# Patient Record
Sex: FEMALE | Race: WHITE | Employment: FULL TIME | URBAN - NONMETROPOLITAN AREA
[De-identification: names, ages, dates, MRNs, and addresses within clinical notes are randomized per-mention and may not be internally consistent; named-entity substitution may affect disease eponyms.]

---

## 2022-06-14 ENCOUNTER — HOSPITAL ENCOUNTER (EMERGENCY)
Age: 62
Discharge: HOME | End: 2022-06-17
Attending: EMERGENCY MEDICINE
Payer: COMMERCIAL

## 2022-06-14 VITALS
TEMPERATURE: 97.5 F | DIASTOLIC BLOOD PRESSURE: 87 MMHG | RESPIRATION RATE: 20 BRPM | HEART RATE: 94 BPM | OXYGEN SATURATION: 92 % | SYSTOLIC BLOOD PRESSURE: 183 MMHG

## 2022-06-14 DIAGNOSIS — R73.9 HYPERGLYCEMIA: ICD-10-CM

## 2022-06-14 DIAGNOSIS — F17.200 TOBACCO USE DISORDER: ICD-10-CM

## 2022-06-14 DIAGNOSIS — R06.02 SHORTNESS OF BREATH: Primary | ICD-10-CM

## 2022-06-14 DIAGNOSIS — R03.0 ELEVATED BLOOD PRESSURE READING: ICD-10-CM

## 2022-06-14 PROCEDURE — 99205 OFFICE O/P NEW HI 60 MIN: CPT

## 2022-06-14 PROCEDURE — 99285 EMERGENCY DEPT VISIT HI MDM: CPT

## 2022-06-14 ASSESSMENT — ENCOUNTER SYMPTOMS
SHORTNESS OF BREATH: 1
DIARRHEA: 0
SORE THROAT: 0
CHOKING: 0
CONSTIPATION: 0
EYE PAIN: 0
FACIAL SWELLING: 0
ROS SKIN COMMENTS: NO RASH OR BRUISING
EYE DISCHARGE: 0
NAUSEA: 0
SINUS PRESSURE: 0
ABDOMINAL PAIN: 0
VOMITING: 0
TROUBLE SWALLOWING: 0
STRIDOR: 0
EYE REDNESS: 0
COUGH: 1
BACK PAIN: 0
WHEEZING: 0
VOICE CHANGE: 0
BLOOD IN STOOL: 0

## 2022-06-14 NOTE — ED PROVIDER NOTES
[address] [facility] [address] [address] EMERGENCY DEPT  [facility]       Chief Complaint   Patient presents with    Hyperglycemia    Shortness of Breath     has COPD and  its hard to breath in this heat and humidity. needs work slip. Nurses Notes reviewed and I agree except as noted in the HPI. HISTORY OF PRESENT ILLNESS   Eric Saini is a 64 y.o. female who presents with 24-hour history of shortness of breath, fatigue, with blood sugar elevated at 373 at home today. No chest pain or fever. Patient has poorly controlled diabetes, history of COPD and hypertension. Denies history of heart failure or CAD. Smoke cigarettes  REVIEW OF SYSTEMS     Review of Systems   Constitutional: Positive for appetite change and fatigue. Negative for chills, fever and unexpected weight change. Fatigue, decreased appetite no fever   HENT: Negative for congestion, ear discharge, ear pain, facial swelling, hearing loss, nosebleeds, postnasal drip, sinus pressure, sore throat, trouble swallowing and voice change. No upper respiratory symptoms   Eyes: Negative for pain, discharge, redness and visual disturbance. No redness or drainage   Respiratory: Positive for cough and shortness of breath. Negative for choking, wheezing and stridor. Shortness of breath cough   Cardiovascular: Negative for chest pain and leg swelling. No chest pain or syncope   Gastrointestinal: Negative for abdominal pain, blood in stool, constipation, diarrhea, nausea and vomiting. No abdominal pain or vomiting   Genitourinary: Negative for dysuria, flank pain, frequency, hematuria, urgency, vaginal bleeding and vaginal discharge. Musculoskeletal: Negative for arthralgias, back pain, neck pain and neck stiffness. Skin: Negative for rash. No rash or bruising   Neurological: Negative for dizziness, seizures, syncope, weakness, light-headedness and headaches.         No headache or lethargy Hematological: Negative for adenopathy. Does not bruise/bleed easily. Psychiatric/Behavioral: Negative for confusion, sleep disturbance and suicidal ideas. The patient is not nervous/anxious. red and bold elements reviewed    PAST MEDICAL HISTORY   History reviewed. No pertinent past medical history. SURGICAL HISTORY     Patient  has no past surgical history on file. CURRENT MEDICATIONS       Previous Medications    No medications on file       ALLERGIES     Patient is has No Known Allergies. FAMILY HISTORY     Patient'sfamily history is not on file. SOCIAL HISTORY     Patient  reports that she has been smoking cigarettes. She has been smoking about 0.50 packs per day. She has never used smokeless tobacco. She reports previous alcohol use. She reports that she does not use drugs. PHYSICAL EXAM     ED TRIAGE VITALS  BP: (!) 183/87, Temp: 97.5 °F (36.4 °C), Heart Rate: 94, Resp: 20, SpO2: 92 % (uses 1 ltr )2 at home)  Physical Exam  Vitals and nursing note reviewed. Constitutional:       General: She is not in acute distress. Appearance: She is well-developed. She is not ill-appearing. Comments: Moist membranes dry cough   HENT:      Head: Normocephalic and atraumatic. Right Ear: External ear normal.      Left Ear: External ear normal.      Nose: Nose normal.      Mouth/Throat:      Pharynx: No oropharyngeal exudate. Comments: Oropharynx normal  Eyes:      General: No scleral icterus. Right eye: No discharge. Left eye: No discharge. Extraocular Movements:      Right eye: Normal extraocular motion. Left eye: Normal extraocular motion. Conjunctiva/sclera: Conjunctivae normal.      Pupils: Pupils are equal, round, and reactive to light. Comments: Clear conjunctiva   Neck:      Thyroid: No thyromegaly. Vascular: No JVD. Comments: No meningismus  Cardiovascular:      Rate and Rhythm: Normal rate and regular rhythm.       Pulses: Normal pulses. Heart sounds: Normal heart sounds, S1 normal and S2 normal. No murmur heard. No friction rub. No gallop. Comments: No murmur  Pulmonary:      Effort: Pulmonary effort is normal. No tachypnea or respiratory distress. Breath sounds: No stridor. Decreased breath sounds present. No wheezing, rhonchi or rales. Comments: Decreased breath sounds  Chest:      Chest wall: No tenderness. Abdominal:      General: Bowel sounds are normal. There is no distension. Palpations: Abdomen is soft. There is no mass. Tenderness: There is no abdominal tenderness. There is no right CVA tenderness, left CVA tenderness, guarding or rebound. Musculoskeletal:         General: No tenderness. Normal range of motion. Cervical back: Normal range of motion. Comments: No DVT   Lymphadenopathy:      Cervical: No cervical adenopathy. Right cervical: No superficial cervical adenopathy. Left cervical: No superficial cervical adenopathy. Skin:     General: Skin is warm and dry. Findings: No erythema or rash. Comments: No rash or bruising   Neurological:      Mental Status: She is alert and oriented to person, place, and time. Cranial Nerves: No cranial nerve deficit. Motor: No abnormal muscle tone. Coordination: Coordination normal.      Deep Tendon Reflexes: Reflexes are normal and symmetric. Reflexes normal.      Comments: Appropriate no focal finding   Psychiatric:         Behavior: Behavior normal.         Thought Content: Thought content normal.         Judgment: Judgment normal.         DIAGNOSTIC RESULTS   Labs: No results found for this visit on 06/14/22. IMAGING:  No orders to display     URGENT CARE COURSE:     Vitals:    06/14/22 1822   BP: (!) 183/87   Pulse: 94   Resp: 20   Temp: 97.5 °F (36.4 °C)   TempSrc: Temporal   SpO2: 92%       Medications - No data to display  PROCEDURES:  None  FINALIMPRESSION      1. Shortness of breath    2.  Hyperglycemia 3. Elevated blood pressure reading    4. Tobacco use disorder        DISPOSITION/PLAN   DISPOSITION    Pt transferred to [facility] ED per her request.  She is stable for private vehicle transfer with friend to drive. Patient accepted in transfer by Maria Guadalupe Davidson [facility] ED charge nurse at 2037. PATIENT REFERRED TO:  to [facility] ED      to [facility] ED    DISCHARGE MEDICATIONS:  New Prescriptions    No medications on file     There are no discharge medications for this patient.       MD Joey West MD  06/14/22 [address], [place]  06/15/22 1007

## 2022-06-15 ASSESSMENT — ENCOUNTER SYMPTOMS
WHEEZING: 0
EYE DISCHARGE: 0
SINUS PRESSURE: 0
CHOKING: 0
CONSTIPATION: 0
STRIDOR: 0
SHORTNESS OF BREATH: 1
BACK PAIN: 0
SORE THROAT: 0
EYE REDNESS: 0
FACIAL SWELLING: 0
EYE PAIN: 0
VOICE CHANGE: 0
VOMITING: 0
ROS SKIN COMMENTS: NO RASH OR BRUISING
BLOOD IN STOOL: 0
TROUBLE SWALLOWING: 0
COUGH: 1
DIARRHEA: 0
ABDOMINAL PAIN: 0
NAUSEA: 0

## 2022-09-21 ENCOUNTER — APPOINTMENT (OUTPATIENT)
Dept: GENERAL RADIOLOGY | Age: 62
DRG: 140 | End: 2022-09-21
Payer: COMMERCIAL

## 2022-09-21 ENCOUNTER — HOSPITAL ENCOUNTER (INPATIENT)
Age: 62
LOS: 1 days | Discharge: HOME | DRG: 140 | End: 2022-09-23
Attending: EMERGENCY MEDICINE | Admitting: STUDENT IN AN ORGANIZED HEALTH CARE EDUCATION/TRAINING PROGRAM
Payer: COMMERCIAL

## 2022-09-21 DIAGNOSIS — J44.1 COPD EXACERBATION (HCC): Primary | ICD-10-CM

## 2022-09-21 DIAGNOSIS — J96.21 ACUTE ON CHRONIC RESPIRATORY FAILURE WITH HYPOXIA AND HYPERCAPNIA (HCC): ICD-10-CM

## 2022-09-21 DIAGNOSIS — J96.22 ACUTE ON CHRONIC RESPIRATORY FAILURE WITH HYPOXIA AND HYPERCAPNIA (HCC): ICD-10-CM

## 2022-09-21 DIAGNOSIS — E11.9 TYPE 2 DIABETES MELLITUS WITHOUT COMPLICATION, WITHOUT LONG-TERM CURRENT USE OF INSULIN (HCC): ICD-10-CM

## 2022-09-21 LAB
ALBUMIN SERPL-MCNC: 4 G/DL (ref 3.5–5.1)
ALLEN TEST: POSITIVE
ALP BLD-CCNC: 227 U/L (ref 38–126)
ALT SERPL-CCNC: 49 U/L (ref 11–66)
ANION GAP SERPL CALCULATED.3IONS-SCNC: 13 MEQ/L (ref 8–16)
AST SERPL-CCNC: 55 U/L (ref 5–40)
BASE EXCESS (CALCULATED): 2.7 MMOL/L (ref -2.5–2.5)
BASOPHILS # BLD: 0.5 %
BASOPHILS ABSOLUTE: 0.1 THOU/MM3 (ref 0–0.1)
BILIRUB SERPL-MCNC: 0.3 MG/DL (ref 0.3–1.2)
BUN BLDV-MCNC: 10 MG/DL (ref 7–22)
CALCIUM SERPL-MCNC: 9.4 MG/DL (ref 8.5–10.5)
CHLORIDE BLD-SCNC: 93 MEQ/L (ref 98–111)
CO2: 27 MEQ/L (ref 23–33)
COLLECTED BY:: ABNORMAL
CREAT SERPL-MCNC: 0.4 MG/DL (ref 0.4–1.2)
DEVICE: ABNORMAL
EKG ATRIAL RATE: 107 BPM
EKG P AXIS: 70 DEGREES
EKG P-R INTERVAL: 150 MS
EKG Q-T INTERVAL: 350 MS
EKG QRS DURATION: 68 MS
EKG QTC CALCULATION (BAZETT): 467 MS
EKG R AXIS: 36 DEGREES
EKG T AXIS: 86 DEGREES
EKG VENTRICULAR RATE: 107 BPM
EOSINOPHIL # BLD: 0.4 %
EOSINOPHILS ABSOLUTE: 0.1 THOU/MM3 (ref 0–0.4)
ERYTHROCYTE [DISTWIDTH] IN BLOOD BY AUTOMATED COUNT: 15.3 % (ref 11.5–14.5)
ERYTHROCYTE [DISTWIDTH] IN BLOOD BY AUTOMATED COUNT: 42.1 FL (ref 35–45)
GFR SERPL CREATININE-BSD FRML MDRD: > 90 ML/MIN/1.73M2
GLUCOSE BLD-MCNC: 209 MG/DL (ref 70–108)
HCO3: 29 MMOL/L (ref 23–28)
HCT VFR BLD CALC: 54.6 % (ref 37–47)
HEMOGLOBIN: 17.9 GM/DL (ref 12–16)
IFIO2: 32
IMMATURE GRANS (ABS): 0.12 THOU/MM3 (ref 0–0.07)
IMMATURE GRANULOCYTES: 0.7 %
LYMPHOCYTES # BLD: 9.7 %
LYMPHOCYTES ABSOLUTE: 1.6 THOU/MM3 (ref 1–4.8)
MCH RBC QN AUTO: 27.4 PG (ref 26–33)
MCHC RBC AUTO-ENTMCNC: 32.8 GM/DL (ref 32.2–35.5)
MCV RBC AUTO: 83.6 FL (ref 81–99)
MONOCYTES # BLD: 5.6 %
MONOCYTES ABSOLUTE: 0.9 THOU/MM3 (ref 0.4–1.3)
NUCLEATED RED BLOOD CELLS: 0 /100 WBC
O2 SATURATION: 96 %
OSMOLALITY CALCULATION: 271.6 MOSMOL/KG (ref 275–300)
PCO2: 48 MMHG (ref 35–45)
PH BLOOD GAS: 7.39 (ref 7.35–7.45)
PLATELET # BLD: 357 THOU/MM3 (ref 130–400)
PMV BLD AUTO: 10.1 FL (ref 9.4–12.4)
PO2: 84 MMHG (ref 71–104)
POTASSIUM REFLEX MAGNESIUM: 4.5 MEQ/L (ref 3.5–5.2)
PRO-BNP: 876.2 PG/ML (ref 0–900)
RBC # BLD: 6.53 MILL/MM3 (ref 4.2–5.4)
SARS-COV-2, NAAT: NOT  DETECTED
SEG NEUTROPHILS: 83.1 %
SEGMENTED NEUTROPHILS ABSOLUTE COUNT: 13.9 THOU/MM3 (ref 1.8–7.7)
SODIUM BLD-SCNC: 133 MEQ/L (ref 135–145)
SOURCE, BLOOD GAS: ABNORMAL
TOTAL PROTEIN: 7.4 G/DL (ref 6.1–8)
TROPONIN T: < 0.01 NG/ML
WBC # BLD: 16.7 THOU/MM3 (ref 4.8–10.8)

## 2022-09-21 PROCEDURE — 94640 AIRWAY INHALATION TREATMENT: CPT

## 2022-09-21 PROCEDURE — 36415 COLL VENOUS BLD VENIPUNCTURE: CPT

## 2022-09-21 PROCEDURE — 71045 X-RAY EXAM CHEST 1 VIEW: CPT

## 2022-09-21 PROCEDURE — 96374 THER/PROPH/DIAG INJ IV PUSH: CPT

## 2022-09-21 PROCEDURE — 82803 BLOOD GASES ANY COMBINATION: CPT

## 2022-09-21 PROCEDURE — 87635 SARS-COV-2 COVID-19 AMP PRB: CPT

## 2022-09-21 PROCEDURE — 2700000000 HC OXYGEN THERAPY PER DAY

## 2022-09-21 PROCEDURE — 84484 ASSAY OF TROPONIN QUANT: CPT

## 2022-09-21 PROCEDURE — 6370000000 HC RX 637 (ALT 250 FOR IP): Performed by: EMERGENCY MEDICINE

## 2022-09-21 PROCEDURE — 94761 N-INVAS EAR/PLS OXIMETRY MLT: CPT

## 2022-09-21 PROCEDURE — 93010 ELECTROCARDIOGRAM REPORT: CPT | Performed by: INTERNAL MEDICINE

## 2022-09-21 PROCEDURE — 80053 COMPREHEN METABOLIC PANEL: CPT

## 2022-09-21 PROCEDURE — 6360000002 HC RX W HCPCS: Performed by: EMERGENCY MEDICINE

## 2022-09-21 PROCEDURE — 85025 COMPLETE CBC W/AUTO DIFF WBC: CPT

## 2022-09-21 PROCEDURE — 36600 WITHDRAWAL OF ARTERIAL BLOOD: CPT

## 2022-09-21 PROCEDURE — 83880 ASSAY OF NATRIURETIC PEPTIDE: CPT

## 2022-09-21 PROCEDURE — 93005 ELECTROCARDIOGRAM TRACING: CPT | Performed by: EMERGENCY MEDICINE

## 2022-09-21 RX ORDER — METHYLPREDNISOLONE SODIUM SUCCINATE 125 MG/2ML
125 INJECTION, POWDER, LYOPHILIZED, FOR SOLUTION INTRAMUSCULAR; INTRAVENOUS ONCE
Status: COMPLETED | OUTPATIENT
Start: 2022-09-21 | End: 2022-09-21

## 2022-09-21 RX ORDER — IPRATROPIUM BROMIDE AND ALBUTEROL SULFATE 2.5; .5 MG/3ML; MG/3ML
1 SOLUTION RESPIRATORY (INHALATION) ONCE
Status: COMPLETED | OUTPATIENT
Start: 2022-09-21 | End: 2022-09-21

## 2022-09-21 RX ADMIN — IPRATROPIUM BROMIDE AND ALBUTEROL SULFATE 1 AMPULE: .5; 3 SOLUTION RESPIRATORY (INHALATION) at 20:05

## 2022-09-21 RX ADMIN — METHYLPREDNISOLONE SODIUM SUCCINATE 125 MG: 125 INJECTION, POWDER, FOR SOLUTION INTRAMUSCULAR; INTRAVENOUS at 20:31

## 2022-09-21 ASSESSMENT — PAIN - FUNCTIONAL ASSESSMENT
PAIN_FUNCTIONAL_ASSESSMENT: NONE - DENIES PAIN
PAIN_FUNCTIONAL_ASSESSMENT: 0-10

## 2022-09-21 ASSESSMENT — PAIN DESCRIPTION - LOCATION: LOCATION: CHEST

## 2022-09-21 ASSESSMENT — PAIN SCALES - GENERAL: PAINLEVEL_OUTOF10: 10

## 2022-09-21 NOTE — ED TRIAGE NOTES
Patient presents to ED with c/o SOB, and congestion. Patient states for last three days she has been running a fever, coughing and congested. Patient is afebrile at this time. Patient has labored respirations at this time. EKG obtained.

## 2022-09-21 NOTE — LETTER
Radha Muhammad  Phone: [phone]    Kaydne FONTANA, RN        September 23, 2022     Patient: Elliott Barcenas   YOB: 1960   Date of Visit: 9/21/2022       To Whom It May Concern: It is my medical opinion that Komal Hawkins may return to work on 9/26/2022. She was hospitalized from 9/21/2022-9/23/2022. If you have any questions or concerns, please don't hesitate to call.     Sincerely,        Kayden FONTANA, RN

## 2022-09-22 PROBLEM — J96.22 ACUTE ON CHRONIC RESPIRATORY FAILURE WITH HYPOXIA AND HYPERCAPNIA (HCC): Status: ACTIVE | Noted: 2022-09-22

## 2022-09-22 PROBLEM — J96.21 ACUTE ON CHRONIC RESPIRATORY FAILURE WITH HYPOXIA AND HYPERCAPNIA (HCC): Status: ACTIVE | Noted: 2022-09-22

## 2022-09-22 LAB
ANION GAP SERPL CALCULATED.3IONS-SCNC: 12 MEQ/L (ref 8–16)
BUN BLDV-MCNC: 13 MG/DL (ref 7–22)
CALCIUM SERPL-MCNC: 9.1 MG/DL (ref 8.5–10.5)
CHLORIDE BLD-SCNC: 92 MEQ/L (ref 98–111)
CO2: 31 MEQ/L (ref 23–33)
CREAT SERPL-MCNC: 0.5 MG/DL (ref 0.4–1.2)
ERYTHROCYTE [DISTWIDTH] IN BLOOD BY AUTOMATED COUNT: 14 % (ref 11.5–14.5)
ERYTHROCYTE [DISTWIDTH] IN BLOOD BY AUTOMATED COUNT: 42.7 FL (ref 35–45)
GFR SERPL CREATININE-BSD FRML MDRD: > 90 ML/MIN/1.73M2
GLUCOSE BLD-MCNC: 167 MG/DL (ref 70–108)
GLUCOSE BLD-MCNC: 186 MG/DL (ref 70–108)
GLUCOSE BLD-MCNC: 196 MG/DL (ref 70–108)
GLUCOSE BLD-MCNC: 304 MG/DL (ref 70–108)
GLUCOSE BLD-MCNC: 306 MG/DL (ref 70–108)
HCT VFR BLD CALC: 52.2 % (ref 37–47)
HEMOGLOBIN: 16.9 GM/DL (ref 12–16)
MAGNESIUM: 2.2 MG/DL (ref 1.6–2.4)
MCH RBC QN AUTO: 27.3 PG (ref 26–33)
MCHC RBC AUTO-ENTMCNC: 32.4 GM/DL (ref 32.2–35.5)
MCV RBC AUTO: 84.5 FL (ref 81–99)
OSMOLALITY CALCULATION: 274 MOSMOL/KG (ref 275–300)
PLATELET # BLD: 325 THOU/MM3 (ref 130–400)
PMV BLD AUTO: 10.5 FL (ref 9.4–12.4)
POTASSIUM SERPL-SCNC: 4.4 MEQ/L (ref 3.5–5.2)
RBC # BLD: 6.18 MILL/MM3 (ref 4.2–5.4)
SODIUM BLD-SCNC: 135 MEQ/L (ref 135–145)
WBC # BLD: 12.7 THOU/MM3 (ref 4.8–10.8)

## 2022-09-22 PROCEDURE — 97165 OT EVAL LOW COMPLEX 30 MIN: CPT

## 2022-09-22 PROCEDURE — 82948 REAGENT STRIP/BLOOD GLUCOSE: CPT

## 2022-09-22 PROCEDURE — 99285 EMERGENCY DEPT VISIT HI MDM: CPT

## 2022-09-22 PROCEDURE — 94640 AIRWAY INHALATION TREATMENT: CPT

## 2022-09-22 PROCEDURE — 1200000000 HC SEMI PRIVATE

## 2022-09-22 PROCEDURE — 99233 SBSQ HOSP IP/OBS HIGH 50: CPT | Performed by: INTERNAL MEDICINE

## 2022-09-22 PROCEDURE — 2580000003 HC RX 258: Performed by: STUDENT IN AN ORGANIZED HEALTH CARE EDUCATION/TRAINING PROGRAM

## 2022-09-22 PROCEDURE — 85027 COMPLETE CBC AUTOMATED: CPT

## 2022-09-22 PROCEDURE — 83735 ASSAY OF MAGNESIUM: CPT

## 2022-09-22 PROCEDURE — 97535 SELF CARE MNGMENT TRAINING: CPT

## 2022-09-22 PROCEDURE — 6370000000 HC RX 637 (ALT 250 FOR IP): Performed by: STUDENT IN AN ORGANIZED HEALTH CARE EDUCATION/TRAINING PROGRAM

## 2022-09-22 PROCEDURE — 6360000002 HC RX W HCPCS: Performed by: STUDENT IN AN ORGANIZED HEALTH CARE EDUCATION/TRAINING PROGRAM

## 2022-09-22 PROCEDURE — 94669 MECHANICAL CHEST WALL OSCILL: CPT

## 2022-09-22 PROCEDURE — 36415 COLL VENOUS BLD VENIPUNCTURE: CPT

## 2022-09-22 PROCEDURE — 80048 BASIC METABOLIC PNL TOTAL CA: CPT

## 2022-09-22 PROCEDURE — 2700000000 HC OXYGEN THERAPY PER DAY

## 2022-09-22 PROCEDURE — 97530 THERAPEUTIC ACTIVITIES: CPT

## 2022-09-22 RX ORDER — METHYLPREDNISOLONE SODIUM SUCCINATE 40 MG/ML
40 INJECTION, POWDER, LYOPHILIZED, FOR SOLUTION INTRAMUSCULAR; INTRAVENOUS DAILY
Status: DISCONTINUED | OUTPATIENT
Start: 2022-09-22 | End: 2022-09-23 | Stop reason: HOSPADM

## 2022-09-22 RX ORDER — LISINOPRIL 20 MG/1
20 TABLET ORAL DAILY
Status: DISCONTINUED | OUTPATIENT
Start: 2022-09-22 | End: 2022-09-23 | Stop reason: HOSPADM

## 2022-09-22 RX ORDER — SODIUM CHLORIDE 0.9 % (FLUSH) 0.9 %
5-40 SYRINGE (ML) INJECTION EVERY 12 HOURS SCHEDULED
Status: DISCONTINUED | OUTPATIENT
Start: 2022-09-22 | End: 2022-09-23 | Stop reason: HOSPADM

## 2022-09-22 RX ORDER — ATORVASTATIN CALCIUM 40 MG/1
40 TABLET, FILM COATED ORAL DAILY
Status: DISCONTINUED | OUTPATIENT
Start: 2022-09-22 | End: 2022-09-23 | Stop reason: HOSPADM

## 2022-09-22 RX ORDER — IPRATROPIUM BROMIDE AND ALBUTEROL SULFATE 2.5; .5 MG/3ML; MG/3ML
1 SOLUTION RESPIRATORY (INHALATION)
Status: DISCONTINUED | OUTPATIENT
Start: 2022-09-22 | End: 2022-09-22

## 2022-09-22 RX ORDER — IPRATROPIUM BROMIDE AND ALBUTEROL SULFATE 2.5; .5 MG/3ML; MG/3ML
1 SOLUTION RESPIRATORY (INHALATION) 3 TIMES DAILY
Status: DISCONTINUED | OUTPATIENT
Start: 2022-09-22 | End: 2022-09-23 | Stop reason: HOSPADM

## 2022-09-22 RX ORDER — DEXTROSE MONOHYDRATE 100 MG/ML
INJECTION, SOLUTION INTRAVENOUS CONTINUOUS PRN
Status: DISCONTINUED | OUTPATIENT
Start: 2022-09-22 | End: 2022-09-23 | Stop reason: HOSPADM

## 2022-09-22 RX ORDER — ALBUTEROL SULFATE 90 UG/1
2 AEROSOL, METERED RESPIRATORY (INHALATION) EVERY 6 HOURS PRN
COMMUNITY
Start: 2022-02-09

## 2022-09-22 RX ORDER — ACETAMINOPHEN 325 MG/1
650 TABLET ORAL EVERY 6 HOURS PRN
Status: DISCONTINUED | OUTPATIENT
Start: 2022-09-21 | End: 2022-09-23 | Stop reason: HOSPADM

## 2022-09-22 RX ORDER — LISINOPRIL 20 MG/1
20 TABLET ORAL DAILY
COMMUNITY
Start: 2022-02-09

## 2022-09-22 RX ORDER — NICOTINE 21 MG/24HR
1 PATCH, TRANSDERMAL 24 HOURS TRANSDERMAL DAILY
Status: DISCONTINUED | OUTPATIENT
Start: 2022-09-22 | End: 2022-09-23 | Stop reason: HOSPADM

## 2022-09-22 RX ORDER — INSULIN LISPRO 100 [IU]/ML
0-4 INJECTION, SOLUTION INTRAVENOUS; SUBCUTANEOUS NIGHTLY
Status: DISCONTINUED | OUTPATIENT
Start: 2022-09-22 | End: 2022-09-23 | Stop reason: HOSPADM

## 2022-09-22 RX ORDER — ENOXAPARIN SODIUM 100 MG/ML
40 INJECTION SUBCUTANEOUS DAILY
Status: DISCONTINUED | OUTPATIENT
Start: 2022-09-22 | End: 2022-09-23 | Stop reason: HOSPADM

## 2022-09-22 RX ORDER — SODIUM CHLORIDE 0.9 % (FLUSH) 0.9 %
10 SYRINGE (ML) INJECTION PRN
Status: DISCONTINUED | OUTPATIENT
Start: 2022-09-21 | End: 2022-09-23 | Stop reason: HOSPADM

## 2022-09-22 RX ORDER — INSULIN LISPRO 100 [IU]/ML
0-4 INJECTION, SOLUTION INTRAVENOUS; SUBCUTANEOUS
Status: DISCONTINUED | OUTPATIENT
Start: 2022-09-22 | End: 2022-09-23 | Stop reason: HOSPADM

## 2022-09-22 RX ORDER — ACETAMINOPHEN 650 MG/1
650 SUPPOSITORY RECTAL EVERY 6 HOURS PRN
Status: DISCONTINUED | OUTPATIENT
Start: 2022-09-21 | End: 2022-09-23 | Stop reason: HOSPADM

## 2022-09-22 RX ORDER — ONDANSETRON 2 MG/ML
4 INJECTION INTRAMUSCULAR; INTRAVENOUS EVERY 6 HOURS PRN
Status: DISCONTINUED | OUTPATIENT
Start: 2022-09-21 | End: 2022-09-23 | Stop reason: HOSPADM

## 2022-09-22 RX ORDER — SODIUM CHLORIDE 9 MG/ML
INJECTION, SOLUTION INTRAVENOUS PRN
Status: DISCONTINUED | OUTPATIENT
Start: 2022-09-21 | End: 2022-09-23 | Stop reason: HOSPADM

## 2022-09-22 RX ORDER — DULAGLUTIDE 0.75 MG/.5ML
0.75 INJECTION, SOLUTION SUBCUTANEOUS WEEKLY
Status: ON HOLD | COMMUNITY
End: 2022-09-23 | Stop reason: HOSPADM

## 2022-09-22 RX ORDER — ONDANSETRON 4 MG/1
4 TABLET, ORALLY DISINTEGRATING ORAL EVERY 8 HOURS PRN
Status: DISCONTINUED | OUTPATIENT
Start: 2022-09-21 | End: 2022-09-23 | Stop reason: HOSPADM

## 2022-09-22 RX ORDER — IPRATROPIUM BROMIDE AND ALBUTEROL SULFATE 2.5; .5 MG/3ML; MG/3ML
1 SOLUTION RESPIRATORY (INHALATION) EVERY 6 HOURS PRN
Status: DISCONTINUED | OUTPATIENT
Start: 2022-09-22 | End: 2022-09-23 | Stop reason: HOSPADM

## 2022-09-22 RX ORDER — POLYETHYLENE GLYCOL 3350 17 G/17G
17 POWDER, FOR SOLUTION ORAL DAILY PRN
Status: DISCONTINUED | OUTPATIENT
Start: 2022-09-21 | End: 2022-09-23 | Stop reason: HOSPADM

## 2022-09-22 RX ORDER — BENZONATATE 100 MG/1
100 CAPSULE ORAL 3 TIMES DAILY PRN
Status: DISCONTINUED | OUTPATIENT
Start: 2022-09-22 | End: 2022-09-23 | Stop reason: HOSPADM

## 2022-09-22 RX ORDER — ATORVASTATIN CALCIUM 40 MG/1
40 TABLET, FILM COATED ORAL DAILY
COMMUNITY
Start: 2022-06-16

## 2022-09-22 RX ADMIN — IPRATROPIUM BROMIDE AND ALBUTEROL SULFATE 1 AMPULE: .5; 3 SOLUTION RESPIRATORY (INHALATION) at 20:59

## 2022-09-22 RX ADMIN — SODIUM CHLORIDE, PRESERVATIVE FREE 10 ML: 5 INJECTION INTRAVENOUS at 20:55

## 2022-09-22 RX ADMIN — IPRATROPIUM BROMIDE AND ALBUTEROL SULFATE 1 AMPULE: .5; 3 SOLUTION RESPIRATORY (INHALATION) at 07:30

## 2022-09-22 RX ADMIN — ACETAMINOPHEN 650 MG: 325 TABLET ORAL at 04:10

## 2022-09-22 RX ADMIN — SODIUM CHLORIDE: 9 INJECTION, SOLUTION INTRAVENOUS at 03:28

## 2022-09-22 RX ADMIN — IPRATROPIUM BROMIDE AND ALBUTEROL SULFATE 1 AMPULE: .5; 3 SOLUTION RESPIRATORY (INHALATION) at 12:00

## 2022-09-22 RX ADMIN — BENZONATATE 100 MG: 100 CAPSULE ORAL at 18:08

## 2022-09-22 RX ADMIN — ATORVASTATIN CALCIUM 40 MG: 40 TABLET, FILM COATED ORAL at 09:04

## 2022-09-22 RX ADMIN — AZITHROMYCIN DIHYDRATE 500 MG: 500 INJECTION, POWDER, LYOPHILIZED, FOR SOLUTION INTRAVENOUS at 04:07

## 2022-09-22 RX ADMIN — ENOXAPARIN SODIUM 40 MG: 100 INJECTION SUBCUTANEOUS at 09:05

## 2022-09-22 RX ADMIN — INSULIN LISPRO 3 UNITS: 100 INJECTION, SOLUTION INTRAVENOUS; SUBCUTANEOUS at 17:01

## 2022-09-22 RX ADMIN — METHYLPREDNISOLONE SODIUM SUCCINATE 40 MG: 40 INJECTION, POWDER, FOR SOLUTION INTRAMUSCULAR; INTRAVENOUS at 09:30

## 2022-09-22 RX ADMIN — INSULIN LISPRO 4 UNITS: 100 INJECTION, SOLUTION INTRAVENOUS; SUBCUTANEOUS at 20:51

## 2022-09-22 RX ADMIN — LISINOPRIL 20 MG: 20 TABLET ORAL at 09:04

## 2022-09-22 RX ADMIN — SODIUM CHLORIDE, PRESERVATIVE FREE 10 ML: 5 INJECTION INTRAVENOUS at 09:04

## 2022-09-22 RX ADMIN — ACETAMINOPHEN 650 MG: 325 TABLET ORAL at 21:22

## 2022-09-22 ASSESSMENT — PAIN DESCRIPTION - LOCATION
LOCATION: FOOT
LOCATION: HEAD

## 2022-09-22 ASSESSMENT — PAIN DESCRIPTION - PAIN TYPE: TYPE: ACUTE PAIN

## 2022-09-22 ASSESSMENT — PAIN DESCRIPTION - DESCRIPTORS
DESCRIPTORS: THROBBING
DESCRIPTORS: ACHING

## 2022-09-22 ASSESSMENT — PAIN - FUNCTIONAL ASSESSMENT
PAIN_FUNCTIONAL_ASSESSMENT: ACTIVITIES ARE NOT PREVENTED

## 2022-09-22 ASSESSMENT — PAIN DESCRIPTION - ORIENTATION: ORIENTATION: RIGHT;LEFT

## 2022-09-22 ASSESSMENT — PAIN SCALES - GENERAL
PAINLEVEL_OUTOF10: 6
PAINLEVEL_OUTOF10: 4
PAINLEVEL_OUTOF10: 3
PAINLEVEL_OUTOF10: 7
PAINLEVEL_OUTOF10: 6
PAINLEVEL_OUTOF10: 7

## 2022-09-22 NOTE — PLAN OF CARE
Problem: Discharge Planning  Goal: Discharge to home or other facility with appropriate resources  Outcome: Progressing  Note: Pt plans home with boyfriend at discharge. Care manager and social working helping with discharge needs. Problem: Pain  Goal: Verbalizes/displays adequate comfort level or baseline comfort level  Outcome: Progressing  Flowsheets (Taken 9/22/2022 6035)  Verbalizes/displays adequate comfort level or baseline comfort level:   Encourage patient to monitor pain and request assistance   Implement non-pharmacological measures as appropriate and evaluate response   Assess pain using appropriate pain scale  Note: Pt report pain at 2 out of 10 on scale. Pt states oral medication helping to achieve pain goal of a zero out of 10 on scale. Problem: Safety - Adult  Goal: Free from fall injury  Outcome: Progressing  Note: Pt using call light appropriately to call for assistance with ambulation to the bathroom and to chair. Pt is also compliant with use of non-skid slippers. Pt reports understanding of fall prevention when discussed. Care plan reviewed with patient. Patient and verbalize understanding of the plan of care and contribute to goal setting.

## 2022-09-22 NOTE — PLAN OF CARE
Problem: Respiratory - Adult  Goal: Clear lung sounds  Outcome: Progressing   Patient agrees to goals

## 2022-09-22 NOTE — PROGRESS NOTES
[place] [id number]  INPATIENT OCCUPATIONAL THERAPY  [facility] ORTHOPEDICS 7K  EVALUATION    Time:   Time In: 0515  Time Out: 6417  Timed Code Treatment Minutes: 23 Minutes  Minutes: 33          Date: 2022  Patient Name: Vesta Esquivel,   Gender: female      MRN: [id number]  : 1960  (58 y.o.)  Referring Practitioner: Arian Alvarez DO  Diagnosis: acute on chronic respiratory failure with hypoxia and hypercapnia  Additional Pertinent Hx: per chart review; Vesta Esquivel is a 58 y.o. female with PMHx of chronic hypoxic respiratory failure on 2 L/min via nasal cannula, COPD, primary HTN, HLD, NIDDM 2, tobacco use, obesity who presents to [address] [place] [address] with complaints of shortness of breath productive cough for 3 days. Dates she started 3 days ago not feeling well. She was having noticeably harder time breathing on top of her 2 L requirement at home. She has increased cough with sputum production and increased purulence which is not at her baseline. She states she has had subjective fevers at home and has felt chills. She denies headaches, chest pain, abdominal pain, N/V/D/C. Chart review has minimal information on patient's medical history. Discussed with pharmacy regarding patient's prescription fill history as questionable accuracy as a historian. It appears she has filled medications for some of her chronic disorders above past however is unclear if she still currently taking them. ED course: Patient noted to be afebrile. She was slightly tachycardic at 107 bpm and hypertensive at 146/96. She is chronically on 2 L/min however was requiring increased FiO2 upon arrival.  This was weaned down to 2 L in the ED however subsequently upon my evaluation patient was satting in the mid 80s and nasal cannula was turned up to 4 L with adequate response with SPO2 greater than 90%. BMP largely noncontributory. BNP WNL and troponin was negative.   Increased of alk phos at 227of unclear significance. Leukocytosis with a WBC of 16.7. Hgb was noted to be elevated at 17.9 consistent with chronic hypoxia. ABG showed 7.3 9/48/84/29 on 0.32 FiO2. CXR had no significant findings. ECG showing sinus tachycardia. COVID-negative. he was given a DuoNeb and 125 mg Solu-Medrol in the ED. Restrictions/Precautions:  Restrictions/Precautions: Fall Risk, General Precautions  Position Activity Restriction  Other position/activity restrictions: 2 L O2 baseline    Subjective  Chart Reviewed: Yes, Orders, Progress Notes, History and Physical  Patient assessed for rehabilitation services?: Yes  Family / Caregiver Present: No    Subjective: RN approved session, patient supine in bed with head elevated upon OT arrival. patient A & O x 4. Pain: 0/10:     Vitals: Oxygen: on 3 L O2 with patient appearing SOB; noted patient did not have nasal cannula in nose and placed in nose with sats at 90%. Cues for pursed lip breathing. Patient sats at 94% once seated in recliner. Social/Functional History:  Lives With: Significant other  Type of Home: Apartment  Home Layout: One level  Home Access: Level entry  Home Equipment: Oxygen   Bathroom Shower/Tub: Tub/Shower unit  Bathroom Toilet: Standard  Bathroom Accessibility: Accessible       ADL Assistance: Independent  Homemaking Assistance: Independent  Ambulation Assistance: Independent  Transfer Assistance: Independent    Active : Yes  Occupation: Full time employment  Type of Occupation:   Additional Comments: used no AD prior to admit    VISION:WFL    HEARING:  WFL    COGNITION: Decreased Insight    RANGE OF MOTION:  Bilateral Upper Extremity:  WFL    STRENGTH:  Bilateral Upper Extremity:  shldr, elbow flex and ext 4/5  (F)    SENSATION:   WFL    ADL:   Grooming: with set-up. To apply deodorant following UB bathing  Bathing: Stand By Assistance.   For UB, cues for initiating in task and thoroughness, MIN A for back, SBA in standing with no AD to bathe cori area with no LOB  Upper Extremity Dressing: Minimal Assistance. To change hospital gown   Lower Extremity Dressing: Stand By Assistance. To doff/don slipper socks  . BALANCE:  Sitting Balance:  Supervision. Standing Balance: Stand By Assistance, [address] With no AD    BED MOBILITY:  Supine to Sit: Stand By Assistance with use of bed rails. Cues for sequencing     TRANSFERS:  Sit to Stand:  Contact Guard Assistance. FUNCTIONAL MOBILITY:  Assistive Device: None  Assist Level:  Contact Guard Assistance. Distance:  from EOB to recliner  Assist to manage O2 tubing       Activity Tolerance:  Patient tolerance of  treatment: fair. Assessment:  Assessment: patient demo overall de-conditioning secondary to respiratory failure and requires cues for pacing self and initaiting in pursed lip breathing to prevent SOB. patient would benefit from continued, skilled OT to edu in energy conservation tech, increase activity tolerance and UB strength and ease and (I) with ADLs and functional transfers to safely transition to prior living environment and decrease re-hospitalization. Performance deficits / Impairments: Decreased functional mobility , Decreased ADL status, Decreased endurance, Decreased strength  Prognosis: Good  REQUIRES OT FOLLOW-UP: Yes  Decision Making: Low Complexity    Treatment Initiated: Treatment and education initiated within context of evaluation. Evaluation time included review of current medical information, gathering information related to past medical, social and functional history, completion of standardized testing, formal and informal observation of tasks, assessment of data and development of plan of care and goals. Treatment time included skilled education and facilitation of tasks to increase safety and independence with ADL's for improved functional independence and quality of life.     Discharge Recommendations:  Continue to assess pending progress, Patient would benefit from continued therapy after discharge    Patient Education:     Patient Education  Education Given To: Patient  Education Provided: Role of Therapy, Transfer Training, Plan of Care, Fall Prevention Strategies, Precautions, ADL Adaptive Strategies  Education Provided Comments: importance of increasing activity, pursed lip breathing  Barriers to Learning: None    Equipment Recommendations:  Equipment Needed: No    Plan:  Times per Week: 5x  Times per Day: Daily  Current Treatment Recommendations: Strengthening, Balance training, Functional mobility training, Safety education & training, Endurance training, Neuromuscular re-education, Patient/Caregiver education & training, Self-Care / ADL. See long-term goal time frame for expected duration of plan of care. If no long-term goals established, a short length of stay is anticipated. Goals:  Patient goals : return home at [facility]  Short Term Goals  Time Frame for Short term goals: by discharge  Short Term Goal 1: patient will tolerate 5 min functional dyn standing with two hand release with (S) with O2 >/= 90% to increase activity tolerance for ADL routine. Short Term Goal 2: patient will participate in moderate resistive UB exer to increase UB strength for functional transfers. Short Term Goal 3: patient will complete ADL routine with MOD I with edu in energy conservation tech to increase ADL success. Short Term Goal 4: patient will functionally ambulate house hold distances with (S) with 0-1 verbal cues for O2 tubing mgmt. Following session, patient left in safe position with all fall risk precautions in place.

## 2022-09-22 NOTE — RT PROTOCOL NOTE
RT Inhaler-Nebulizer Bronchodilator Protocol Note    There is a bronchodilator order in the chart from a provider indicating to follow the RT Bronchodilator Protocol and there is an Initiate RT Inhaler-Nebulizer Bronchodilator Protocol order as well (see protocol at bottom of note). CXR Findings:  XR CHEST PORTABLE    Result Date: 9/21/2022  1. No acute findings. This document has been electronically signed by: Grace Norman MD on 09/21/2022 08:04 PM      The findings from the last RT Protocol Assessment were as follows:   History Pulmonary Disease: Chronic pulmonary disease  Respiratory Pattern: Dyspnea on exertion or RR 21-25 bpm  Breath Sounds: Slightly diminished and/or crackles  Cough: Strong, productive  Indication for Bronchodilator Therapy: Decreased or absent breath sounds  Bronchodilator Assessment Score: 7    Aerosolized bronchodilator medication orders have been revised according to the RT Inhaler-Nebulizer Bronchodilator Protocol below. Respiratory Therapist to perform RT Therapy Protocol Assessment initially then follow the protocol. Repeat RT Therapy Protocol Assessment PRN for score 0-3 or on second treatment, BID, and PRN for scores above 3. No Indications - adjust the frequency to every 6 hours PRN wheezing or bronchospasm, if no treatments needed after 48 hours then discontinue using Per Protocol order mode. If indication present, adjust the RT bronchodilator orders based on the Bronchodilator Assessment Score as indicated below. Use Inhaler orders unless patient has one or more of the following: on home nebulizer, not able to hold breath for 10 seconds, is not alert and oriented, cannot activate and use MDI correctly, or respiratory rate 25 breaths per minute or more, then use the equivalent nebulizer order(s) with same Frequency and PRN reasons based on the score.   If a patient is on this medication at home then do not decrease Frequency below that used at home.    0-3 - enter or revise RT bronchodilator order(s) to equivalent RT Bronchodilator order with Frequency of every 4 hours PRN for wheezing or increased work of breathing using Per Protocol order mode. 4-6 - enter or revise RT Bronchodilator order(s) to two equivalent RT bronchodilator orders with one order with BID Frequency and one order with Frequency of every 4 hours PRN wheezing or increased work of breathing using Per Protocol order mode. 7-10 - enter or revise RT Bronchodilator order(s) to two equivalent RT bronchodilator orders with one order with TID Frequency and one order with Frequency of every 4 hours PRN wheezing or increased work of breathing using Per Protocol order mode. 11-13 - enter or revise RT Bronchodilator order(s) to one equivalent RT bronchodilator order with QID Frequency and an Albuterol order with Frequency of every 4 hours PRN wheezing or increased work of breathing using Per Protocol order mode. Greater than 13 - enter or revise RT Bronchodilator order(s) to one equivalent RT bronchodilator order with every 4 hours Frequency and an Albuterol order with Frequency of every 2 hours PRN wheezing or increased work of breathing using Per Protocol order mode. RT to enter RT Home Evaluation for COPD & MDI Assessment order using Per Protocol order mode.     Electronically signed by Gabby Guidry RCP on 9/22/2022 at 7:41 AM

## 2022-09-22 NOTE — PROGRESS NOTES
Hospitalist          Patient: Chelly Miner  : 1960  MRN: [id number]     Acct: [de-identified]    PCP: JOSE C Campos  Date of Admission: 2022  Date of Service: Pt seen/examined on 22  and Admitted to Inpatient with expected LOS greater than two midnights due to medical therapy. Hospital Problems             Last Modified POA    * (Principal) Acute on chronic respiratory failure with hypoxia and hypercapnia (HCC) 2022 Yes       Assessment and Plan:  Acute on chronic respiratory failure: With hypoxemia and hypercapnia. Secondary to ACOPDE. Chronically on 2L all time at home. Secondary to COPD. P/w 3 day hx of worsening dyspnea, increased cough and sputum production and purulence. Subjective fever and chills at home. States she only take albuterol at home. Requiring increased FiO2 from baseline. Wean FiO2 to keep SpO2 >90%  Empiric Abx azithromycin  Methylprednisolone 40mg daily x4 days. S/p 125mg in ED  Duonebs q4h while awake and q6h prn  IS/Acapella  Tessalon pearls PRN. COPD: No PFT to review. Appears to have been on previous inhalers including bevespi in past however patient states she no longer takes and only uses albuterol as needed. She does not follow with pulmonology locally as recently moved from [facility]. Treatment as above however will need long term management. F/u with Pulmonology as OP. Primary HTN: Continue lisinopril 20mg daily with hold parameters  NIDDM2: HgbA1c 8.4 . States she only takes metformin at home though records show glimepiride 2 mg twice daily, Jardiance 25 mg daily. Hold 4 [facility]. Low dose SSI. Hypoglycemia protocol. POCT Glucose. Carb control diet. Active tobacco use: Currently smokes 1/2 ppd. Strongly encourage cessation. Nicotine patch for cravings  HLD: Continue statin  Elevated ALP: 227 on arrival. Unclear significance. No previous to review. Monitor. Obesity Class I: Body mass index is 34.34 kg/m².  Encourage lifestyle modifications. 9.22.2022 pt seen this am, feeling better. Discussed the need to STOP smoking patient states she cant and wont. Expect BG to rise. Chief Complaint:  SOB    History Of Present Illness:  Coretta Dudley is a 58 y.o. female with PMHx of chronic hypoxic respiratory failure on 2 L/min via nasal cannula, COPD, primary HTN, HLD, NIDDM 2, tobacco use, obesity who presents to [facility] with complaints of shortness of breath productive cough for 3 days. Dates she started 3 days ago not feeling well. She was having noticeably harder time breathing on top of her 2 L requirement at home. She has increased cough with sputum production and increased purulence which is not at her baseline. She states she has had subjective fevers at home and has felt chills. She denies headaches, chest pain, abdominal pain, N/V/D/C. Chart review has minimal information on patient's medical history. Discussed with pharmacy regarding patient's prescription fill history as questionable accuracy as a historian. It appears she has filled medications for some of her chronic disorders above past however is unclear if she still currently taking them. ED course: Patient noted to be afebrile. She was slightly tachycardic at 107 bpm and hypertensive at 146/96. She is chronically on 2 L/min however was requiring increased FiO2 upon arrival.  This was weaned down to 2 L in the ED however subsequently upon my evaluation patient was satting in the mid 80s and nasal cannula was turned up to 4 L with adequate response with SPO2 greater than 90%. BMP largely noncontributory. BNP WNL and troponin was negative. Increased of alk phos at 227of unclear significance. Leukocytosis with a WBC of 16.7. Hgb was noted to be elevated at 17.9 consistent with chronic hypoxia. ABG showed 7.3 9/48/84/29 on 0.32 FiO2. CXR had no significant findings. ECG showing sinus tachycardia. COVID-negative.  he was given a DuoNeb and 125 mg Solu-Medrol in the ED. She was admitted to hospital service for further evaluation and management. Past Medical History:    History reviewed. No pertinent past medical history. Past Surgical History:    History reviewed. No pertinent surgical history. Medications Prior to Admission:   Prior to Admission medications    Medication Sig Start Date End Date Taking? Authorizing Provider   atorvastatin (LIPITOR) 40 MG tablet Take 40 mg by mouth daily 6/16/22  Yes Historical Provider, MD   albuterol sulfate HFA (PROVENTIL;VENTOLIN;PROAIR) 108 (90 Base) MCG/ACT inhaler Inhale 2 puffs into the lungs every 6 hours as needed 2/9/22  Yes Historical Provider, MD   lisinopril (PRINIVIL;ZESTRIL) 20 MG tablet Take 20 mg by mouth daily 2/9/22  Yes Historical Provider, MD   metFORMIN (GLUCOPHAGE) 1000 MG tablet Take 1,000 mg by mouth 2 times daily (with meals) 2/9/22  Yes Historical Provider, MD   Dulaglutide (TRULICITY) 1.82 IU/8.6FY SOPN Inject 0.75 mg into the skin once a week   Yes Historical Provider, MD       Allergies:  Patient has no known allergies. Social History:    Tobacco use:   reports that she has been smoking cigarettes. She has been smoking an average of .5 packs per day. She has never used smokeless tobacco.  Alcohol use:   reports that she does not currently use alcohol. Drug use:  reports no history of drug use. Family History:   as follows:  History reviewed. No pertinent family history. Review of Systems:   Pertinent positives and negatives as noted in the HPI. Otherwise complete ROS negative. Physical Exam:    /66   Pulse 90   Temp 98.2 °F (36.8 °C) (Oral)   Resp 22   Ht 4' 11\" (1.499 m)   Wt 170 lb (77.1 kg)   SpO2 90%   BMI 34.34 kg/m²       General appearance: Chronically ill-appearing obese female no apparent distress, appears stated age. Eyes:  Pupils equal, round, and reactive to light. Conjunctivae/corneas clear. HENT: Head normal in appearance.  External nares normal.  Oral mucosa moist without lesions. Hearing grossly intact. Neck: Supple, with full range of motion. Trachea midline. No gross JVD appreciated. Respiratory: Diminished throughout. Wheezes noted. 4L via NC. Cardiovascular: Normal rate, regular rhythm with normal S1/S2 without murmurs. No lower extremity edema. Abdomen: Soft, non-tender, non-distended with normal bowel sounds. Obese. Musculoskeletal: No joint swelling or tenderness. Normal tone. No abnormal movements. Skin: Warm and dry. No rashes or lesions. Neurologic:  No focal sensory/motor deficits in the upper and lower extremities. Cranial nerves:  grossly non-focal 2-12. Psychiatric: Alert and oriented, normal insight and thought content. Capillary Refill: Brisk,< 3 seconds. Peripheral Pulses: +2 palpable, equal bilaterally. Labs:     Recent Labs     09/21/22 2001 09/22/22  0506   WBC 16.7* 12.7*   HGB 17.9* 16.9*   HCT 54.6* 52.2*    325     Recent Labs     09/21/22 2001 09/22/22  0506   * 135   K 4.5 4.4   CL 93* 92*   CO2 27 31   BUN 10 13   CREATININE 0.4 0.5   CALCIUM 9.4 9.1     Recent Labs     09/21/22 2001   AST 55*   ALT 49   BILITOT 0.3   ALKPHOS 227*     No results for input(s): INR in the last 72 hours. Recent Labs     09/21/22 2001   TROPONINT < 0.010     No results found for: Lavon Lasso, BACTERIA, RBCUA, BLOODU, SPECGRAV, GLUCOSEU      Radiology:     XR CHEST PORTABLE   Final Result   1. No acute findings. This document has been electronically signed by: Penelope Serrano MD on 09/21/2022 08:04 PM             EKG:  I have reviewed the EKG with the following interpretation: Sinus tachycardia      PT/OT Eval Status:  will be assessed  Diet: ADULT DIET;  Regular; 5 carb choices (75 gm/meal)  DVT prophylaxis: Lovenox  Code Status: Full Code  Disposition: admit to Medical     Thank you JOSE C Ramirez for the opportunity to be involved in this patient's care.    Electronically signed by Alix Hodgkins, DO on 9/22/2022 at 9:15 AM.

## 2022-09-22 NOTE — ED NOTES
Patient resting in bed eating jello at this time. Call light within reach.      Manasa Silver RN  09/21/22 2766

## 2022-09-22 NOTE — ED PROVIDER NOTES
[facility] [address] [address] EMERGENCY DEPT  [address] [facility] [address]  Phone: [phone]      CHIEF COMPLAINT       Chief Complaint   Patient presents with    Shortness of Breath       Nurses Notes reviewed and I agree except as noted in the HPI. HISTORY OF PRESENT ILLNESS    Luis Quintana is a 58 y.o. female. Patient brought in by family for worsening dyspnea. She does wear 2 L at home but appear to be more hypoxic than that here. Does have underlying COPD. She was noted to have wheezing and initially elevated respiratory rate. Appeared to be breathing uncomfortably with conversational dyspnea. REVIEW OF SYSTEMS         No fever, has had some coughing, no abdominal pain    Remainder of review of systems is otherwise reviewed as negative. PAST MEDICAL HISTORY    has no past medical history on file. SURGICAL HISTORY      has no past surgical history on file. CURRENT MEDICATIONS       Previous Medications    ALBUTEROL SULFATE HFA (PROVENTIL;VENTOLIN;PROAIR) 108 (90 BASE) MCG/ACT INHALER    Inhale 2 puffs into the lungs every 6 hours as needed    ATORVASTATIN (LIPITOR) 40 MG TABLET    Take 40 mg by mouth daily    LISINOPRIL (PRINIVIL;ZESTRIL) 20 MG TABLET    Take 20 mg by mouth daily    METFORMIN (GLUCOPHAGE) 1000 MG TABLET    Take 1,000 mg by mouth 2 times daily (with meals)       ALLERGIES     has No Known Allergies. FAMILY HISTORY     has no family status information on file. family history is not on file. SOCIAL HISTORY      reports that she has been smoking cigarettes. She has been smoking an average of .5 packs per day. She has never used smokeless tobacco. She reports that she does not currently use alcohol. She reports that she does not use drugs. PHYSICAL EXAM     INITIAL VITALS:  height is 4' 11\" (1.499 m) and weight is 170 lb (77.1 kg). Her oral temperature is 98 °F (36.7 °C). Her blood pressure is 164/80 (abnormal) and her pulse is 94.  Her respiration is 18 and oxygen saturation is 95%. Constitutional: ill appearing  Eyes:  Pupils are equal and reactive  HENT:  Atraumatic appearing  oropharynx moist, no pharyngeal exudates. Neck- normal range of motion supple   Respiratory: Wheezing and diminishment bilaterally  Cardiovascular: regular, no murmur  GI:  Non tender, no rigidity, rebound or guarding  Musculoskeletal:  2/4 distal pulses, +2  pitting edema  Integument: warm and dry  Neurologic:  Alert & oriented x 3  Psychiatric:  Speech and behavior appropriate        DIAGNOSTIC RESULTS     EKG: All EKG's are interpreted by the Emergency Department Physician who either signs or Co-signs this chart in the absence of a cardiologist.  EKG interpreted by me showing sinus rhythm at a rate of 107, QRS of 68, QTc of 467, axis of 36.     RADIOLOGY: non-plain film images(s) such as CT, Ultrasound and MRI are read by the radiologist.  Chest x-ray interpreted by the radiologist as negative    LABS:   Labs Reviewed   BLOOD GAS, ARTERIAL - Abnormal; Notable for the following components:       Result Value    PCO2 48 (*)     HCO3 29 (*)     Base Excess (Calculated) 2.7 (*)     All other components within normal limits   CBC WITH AUTO DIFFERENTIAL - Abnormal; Notable for the following components:    WBC 16.7 (*)     RBC 6.53 (*)     Hemoglobin 17.9 (*)     Hematocrit 54.6 (*)     RDW-CV 15.3 (*)     Segs Absolute 13.9 (*)     Immature Grans (Abs) 0.12 (*)     All other components within normal limits   COMPREHENSIVE METABOLIC PANEL W/ REFLEX TO MG FOR LOW K - Abnormal; Notable for the following components:    Glucose 209 (*)     Sodium 133 (*)     Chloride 93 (*)     AST 55 (*)     Alkaline Phosphatase 227 (*)     All other components within normal limits   OSMOLALITY - Abnormal; Notable for the following components:    Osmolality Calc 271.6 (*)     All other components within normal limits   COVID-19, RAPID   TROPONIN   BRAIN NATRIURETIC PEPTIDE   ANION GAP   GLOMERULAR FILTRATION RATE, ESTIMATED   CBC   BASIC METABOLIC PANEL   MAGNESIUM       EMERGENCY DEPARTMENT COURSE:   Vitals:    Vitals:    09/21/22 2051 09/21/22 2139 09/21/22 2257 09/21/22 2358   BP: 118/86 (!) 111/90 95/69 (!) 164/80   Pulse: (!) 103 100 99 94   Resp: 25 22 22 18   Temp:       TempSrc:       SpO2: 95% 95% 95% 95%   Weight:       Height:         Patient appears to be having a COPD exacerbation. Received breathing treatments and steroids. She appears to be more hypoxic than her baseline and at least initially was somewhat tachypneic. I think she needs to come in.   Case discussed with the hospitalist to arrange for admission      CRITICAL CARE:   none        FINAL IMPRESSION      1. COPD exacerbation ([address]          DISPOSITION/PLAN   Admitted    DISCHARGE MEDICATIONS:  New Prescriptions    No medications on file       (Please note that portions of this note were completed with a voice recognition program.  Efforts were made to edit the dictations but occasionally words are mis-transcribed.)    Jeremiah Neal, [address] [place] [address] [place],   09/22/22 0050

## 2022-09-22 NOTE — CARE COORDINATION
9/22/22, 11:26 AM EDT  DISCHARGE PLANNING EVALUATION:    Luis Quintana       Admitted: 9/21/2022/ [facility] day: 0   Location: -28/028-A Reason for admit: COPD exacerbation ([facility] [address] [J44.1]  Acute on chronic respiratory failure with hypoxia and hypercapnia ([facility] [address] [J96.21, J96.22]   PMH:  has no past medical history on file. Procedure:   9/21 CXR: No acute findings. Barriers to Discharge:  Presented with increased sob. Hx COPD. Hospitalist following. IV zithromax. Tessalon pearls. Solumedrol. Nebs. On 3L NC, baseline is 2L. PCP: JOSE C Daniels  Readmission Risk Score: 7.9%  Patient's Healthcare Decision Maker: Patient Declined (Legal Next of Kin Remains as Decision Maker)    Patient Goals/Plan/Treatment Preferences: Spoke with José Davide and family. She plans to return home with s/o. She uses 2L NC at home believes it is supplied through [employer] [employer] [facility]. She does not use dme or have home services. Denies needs at this time. Transportation/Food Security/Housekeeping Addressed:  No issues identified.

## 2022-09-22 NOTE — PROGRESS NOTES
Pt admitted to  [id number] per stretcher from ED at [place] [address]. Complains of SOB, but no pain. IV in R wrist not infusing at this time. IV site free of s/s of infection or infiltration. Instructed in use of call light, tv controls, bed controls and 5 minute rule scripted to pt with understanding verbalized. Fall and safety brochure discussed with pt.      2 RN skin check performed by Maral Randall RN. No issues noted. Call light within reach.

## 2022-09-22 NOTE — H&P
Internal Medicine Resident History and Physical          Patient: Chio Peraza  : 1960  MRN: [id number]     Acct: [de-identified]    PCP: JOSE C Snider  Date of Admission: 2022  Date of Service: Pt seen/examined on 22  and Admitted to Inpatient with expected LOS greater than two midnights due to medical therapy. Hospital Problems             Last Modified POA    * (Principal) Acute on chronic respiratory failure with hypoxia and hypercapnia (HCC) 2022 Yes       Assessment and Plan:  Acute on chronic respiratory failure: With hypoxemia and hypercapnia. Secondary to ACOPDE. Chronically on 2L all time at home. Secondary to COPD. P/w 3 day hx of worsening dyspnea, increased cough and sputum production and purulence. Subjective fever and chills at home. States she only take albuterol at home. Requiring increased FiO2 from baseline. Wean FiO2 to keep SpO2 >90%  Empiric Abx azithromycin  Methylprednisolone 40mg daily x4 days. S/p 125mg in ED  Duonebs q4h while awake and q6h prn  IS/Acapella  Tessalon pearls PRN. COPD: No PFT to review. Appears to have been on previous inhalers including bevespi in past however patient states she no longer takes and only uses albuterol as needed. She does not follow with pulmonology locally as recently moved from [facility]. Treatment as above however will need long term management. F/u with Pulmonology as OP. Primary HTN: Continue lisinopril 20mg daily with hold parameters  NIDDM2: HgbA1c 8.4 . States she only takes metformin at home though records show glimepiride 2 mg twice daily, Jardiance 25 mg daily. Hold 4 [facility]. Low dose SSI. Hypoglycemia protocol. POCT Glucose. Carb control diet. Active tobacco use: Currently smokes 1/2 ppd. Strongly encourage cessation. Nicotine patch for cravings  HLD: Continue statin  Elevated ALP: 227 on arrival. Unclear significance. No previous to review. Monitor.   Obesity Class I: Body mass index is 34.34 kg/m². Encourage lifestyle modifications.      =======================================================================    Chief Complaint:  SOB    History Of Present Illness:  Eddie Dubois is a 58 y.o. female with PMHx of chronic hypoxic respiratory failure on 2 L/min via nasal cannula, COPD, primary HTN, HLD, NIDDM 2, tobacco use, obesity who presents to [address] [place] [address] with complaints of shortness of breath productive cough for 3 days. Dates she started 3 days ago not feeling well. She was having noticeably harder time breathing on top of her 2 L requirement at home. She has increased cough with sputum production and increased purulence which is not at her baseline. She states she has had subjective fevers at home and has felt chills. She denies headaches, chest pain, abdominal pain, N/V/D/C. Chart review has minimal information on patient's medical history. Discussed with pharmacy regarding patient's prescription fill history as questionable accuracy as a historian. It appears she has filled medications for some of her chronic disorders above past however is unclear if she still currently taking them. ED course: Patient noted to be afebrile. She was slightly tachycardic at 107 bpm and hypertensive at 146/96. She is chronically on 2 L/min however was requiring increased FiO2 upon arrival.  This was weaned down to 2 L in the ED however subsequently upon my evaluation patient was satting in the mid 80s and nasal cannula was turned up to 4 L with adequate response with SPO2 greater than 90%. BMP largely noncontributory. BNP WNL and troponin was negative. Increased of alk phos at 227of unclear significance. Leukocytosis with a WBC of 16.7. Hgb was noted to be elevated at 17.9 consistent with chronic hypoxia. ABG showed 7.3 9/48/84/29 on 0.32 FiO2. CXR had no significant findings. ECG showing sinus tachycardia. COVID-negative.  he was given a DuoNeb and 125 mg Solu-Medrol in the ED.  She was admitted to hospital service for further evaluation and management. Past Medical History:    History reviewed. No pertinent past medical history. Past Surgical History:    History reviewed. No pertinent surgical history. Medications Prior to Admission:   Prior to Admission medications    Medication Sig Start Date End Date Taking? Authorizing Provider   atorvastatin (LIPITOR) 40 MG tablet Take 40 mg by mouth daily 6/16/22  Yes Historical Provider, MD   albuterol sulfate HFA (PROVENTIL;VENTOLIN;PROAIR) 108 (90 Base) MCG/ACT inhaler Inhale 2 puffs into the lungs every 6 hours as needed 2/9/22  Yes Historical Provider, MD   lisinopril (PRINIVIL;ZESTRIL) 20 MG tablet Take 20 mg by mouth daily 2/9/22  Yes Historical Provider, MD   metFORMIN (GLUCOPHAGE) 1000 MG tablet Take 1,000 mg by mouth 2 times daily (with meals) 2/9/22  Yes Historical Provider, MD       Allergies:  Patient has no known allergies. Social History:    Tobacco use:   reports that she has been smoking cigarettes. She has been smoking an average of .5 packs per day. She has never used smokeless tobacco.  Alcohol use:   reports that she does not currently use alcohol. Drug use:  reports no history of drug use. Family History:   as follows:  History reviewed. No pertinent family history. Review of Systems:   Pertinent positives and negatives as noted in the HPI. Otherwise complete ROS negative. Physical Exam:    BP (!) 164/80   Pulse 94   Temp 98 °F (36.7 °C) (Oral)   Resp 18   Ht 4' 11\" (1.499 m)   Wt 170 lb (77.1 kg)   SpO2 95%   BMI 34.34 kg/m²       General appearance: Chronically ill-appearing obese female no apparent distress, appears stated age. Eyes:  Pupils equal, round, and reactive to light. Conjunctivae/corneas clear. HENT: Head normal in appearance. External nares normal.  Oral mucosa moist without lesions. Hearing grossly intact. Neck: Supple, with full range of motion. Trachea midline.   No gross JVD appreciated. Respiratory: Diminished throughout. Wheezes noted. 4L via NC. Cardiovascular: Normal rate, regular rhythm with normal S1/S2 without murmurs. No lower extremity edema. Abdomen: Soft, non-tender, non-distended with normal bowel sounds. Obese. Musculoskeletal: No joint swelling or tenderness. Normal tone. No abnormal movements. Skin: Warm and dry. No rashes or lesions. Neurologic:  No focal sensory/motor deficits in the upper and lower extremities. Cranial nerves:  grossly non-focal 2-12. Psychiatric: Alert and oriented, normal insight and thought content. Capillary Refill: Brisk,< 3 seconds. Peripheral Pulses: +2 palpable, equal bilaterally. Labs:     Recent Labs     09/21/22 2001   WBC 16.7*   HGB 17.9*   HCT 54.6*        Recent Labs     09/21/22 2001   *   K 4.5   CL 93*   CO2 27   BUN 10   CREATININE 0.4   CALCIUM 9.4     Recent Labs     09/21/22 2001   AST 55*   ALT 49   BILITOT 0.3   ALKPHOS 227*     No results for input(s): INR in the last 72 hours. Recent Labs     09/21/22 2001   TROPONINT < 0.010     No results found for: Li Emanuel, BACTERIA, RBCUA, BLOODU, SPECGRAV, GLUCOSEU      Radiology:     XR CHEST PORTABLE   Final Result   1. No acute findings. This document has been electronically signed by: Faustino Hernandez MD on 09/21/2022 08:04 PM             EKG:  I have reviewed the EKG with the following interpretation: Sinus tachycardia      PT/OT Eval Status:  will be assessed  Diet: ADULT DIET; Regular; 5 carb choices (75 gm/meal)  DVT prophylaxis: Lovenox  Code Status: Full Code  Disposition: admit to Medical     Thank you JOSE C Lobato for the opportunity to be involved in this patient's care. Electronically signed by Hailey Segal DO on 9/22/2022 at 1:07 AM.     Case discussed with Attending, Dr. Abbie Pacheco.

## 2022-09-23 VITALS
SYSTOLIC BLOOD PRESSURE: 146 MMHG | HEIGHT: 59 IN | WEIGHT: 170 LBS | RESPIRATION RATE: 22 BRPM | BODY MASS INDEX: 34.27 KG/M2 | TEMPERATURE: 97.3 F | OXYGEN SATURATION: 93 % | DIASTOLIC BLOOD PRESSURE: 78 MMHG | HEART RATE: 88 BPM

## 2022-09-23 LAB
ANION GAP SERPL CALCULATED.3IONS-SCNC: 11 MEQ/L (ref 8–16)
BUN BLDV-MCNC: 15 MG/DL (ref 7–22)
CALCIUM SERPL-MCNC: 8.7 MG/DL (ref 8.5–10.5)
CHLORIDE BLD-SCNC: 98 MEQ/L (ref 98–111)
CO2: 29 MEQ/L (ref 23–33)
CREAT SERPL-MCNC: 0.6 MG/DL (ref 0.4–1.2)
ERYTHROCYTE [DISTWIDTH] IN BLOOD BY AUTOMATED COUNT: 13.7 % (ref 11.5–14.5)
ERYTHROCYTE [DISTWIDTH] IN BLOOD BY AUTOMATED COUNT: 42 FL (ref 35–45)
GFR SERPL CREATININE-BSD FRML MDRD: > 90 ML/MIN/1.73M2
GLUCOSE BLD-MCNC: 202 MG/DL (ref 70–108)
GLUCOSE BLD-MCNC: 225 MG/DL (ref 70–108)
GLUCOSE BLD-MCNC: 272 MG/DL (ref 70–108)
HCT VFR BLD CALC: 46.8 % (ref 37–47)
HEMOGLOBIN: 14.9 GM/DL (ref 12–16)
MAGNESIUM: 2.5 MG/DL (ref 1.6–2.4)
MCH RBC QN AUTO: 26.8 PG (ref 26–33)
MCHC RBC AUTO-ENTMCNC: 31.8 GM/DL (ref 32.2–35.5)
MCV RBC AUTO: 84.2 FL (ref 81–99)
PLATELET # BLD: 309 THOU/MM3 (ref 130–400)
PMV BLD AUTO: 10.3 FL (ref 9.4–12.4)
POTASSIUM SERPL-SCNC: 4.9 MEQ/L (ref 3.5–5.2)
RBC # BLD: 5.56 MILL/MM3 (ref 4.2–5.4)
SODIUM BLD-SCNC: 138 MEQ/L (ref 135–145)
WBC # BLD: 12.6 THOU/MM3 (ref 4.8–10.8)

## 2022-09-23 PROCEDURE — 82948 REAGENT STRIP/BLOOD GLUCOSE: CPT

## 2022-09-23 PROCEDURE — 85027 COMPLETE CBC AUTOMATED: CPT

## 2022-09-23 PROCEDURE — 97535 SELF CARE MNGMENT TRAINING: CPT

## 2022-09-23 PROCEDURE — 6370000000 HC RX 637 (ALT 250 FOR IP): Performed by: STUDENT IN AN ORGANIZED HEALTH CARE EDUCATION/TRAINING PROGRAM

## 2022-09-23 PROCEDURE — 94640 AIRWAY INHALATION TREATMENT: CPT

## 2022-09-23 PROCEDURE — 94760 N-INVAS EAR/PLS OXIMETRY 1: CPT

## 2022-09-23 PROCEDURE — 36415 COLL VENOUS BLD VENIPUNCTURE: CPT

## 2022-09-23 PROCEDURE — 2700000000 HC OXYGEN THERAPY PER DAY

## 2022-09-23 PROCEDURE — 97530 THERAPEUTIC ACTIVITIES: CPT

## 2022-09-23 PROCEDURE — 6360000002 HC RX W HCPCS: Performed by: STUDENT IN AN ORGANIZED HEALTH CARE EDUCATION/TRAINING PROGRAM

## 2022-09-23 PROCEDURE — 2580000003 HC RX 258: Performed by: STUDENT IN AN ORGANIZED HEALTH CARE EDUCATION/TRAINING PROGRAM

## 2022-09-23 PROCEDURE — 83735 ASSAY OF MAGNESIUM: CPT

## 2022-09-23 PROCEDURE — 99239 HOSP IP/OBS DSCHRG MGMT >30: CPT | Performed by: PHYSICIAN ASSISTANT

## 2022-09-23 PROCEDURE — 80048 BASIC METABOLIC PNL TOTAL CA: CPT

## 2022-09-23 RX ORDER — METHYLPREDNISOLONE 4 MG/1
TABLET ORAL
Qty: 1 KIT | Refills: 0 | Status: SHIPPED | OUTPATIENT
Start: 2022-09-23 | End: 2022-09-26 | Stop reason: SDUPTHER

## 2022-09-23 RX ORDER — AZITHROMYCIN 500 MG/1
500 TABLET, FILM COATED ORAL DAILY
Qty: 3 TABLET | Refills: 0 | Status: SHIPPED | OUTPATIENT
Start: 2022-09-23 | End: 2022-09-26 | Stop reason: SDUPTHER

## 2022-09-23 RX ORDER — IPRATROPIUM BROMIDE AND ALBUTEROL SULFATE 2.5; .5 MG/3ML; MG/3ML
3 SOLUTION RESPIRATORY (INHALATION) EVERY 6 HOURS PRN
Qty: 360 ML | Refills: 0 | Status: SHIPPED | OUTPATIENT
Start: 2022-09-23 | End: 2022-09-26 | Stop reason: SDUPTHER

## 2022-09-23 RX ORDER — DULAGLUTIDE 1.5 MG/.5ML
1.5 INJECTION, SOLUTION SUBCUTANEOUS WEEKLY
Qty: 4 ADJUSTABLE DOSE PRE-FILLED PEN SYRINGE | Refills: 1 | Status: SHIPPED | OUTPATIENT
Start: 2022-09-23 | End: 2022-09-26 | Stop reason: SDUPTHER

## 2022-09-23 RX ADMIN — METHYLPREDNISOLONE SODIUM SUCCINATE 40 MG: 40 INJECTION, POWDER, FOR SOLUTION INTRAMUSCULAR; INTRAVENOUS at 08:37

## 2022-09-23 RX ADMIN — INSULIN LISPRO 1 UNITS: 100 INJECTION, SOLUTION INTRAVENOUS; SUBCUTANEOUS at 08:37

## 2022-09-23 RX ADMIN — SODIUM CHLORIDE, PRESERVATIVE FREE 10 ML: 5 INJECTION INTRAVENOUS at 08:38

## 2022-09-23 RX ADMIN — ATORVASTATIN CALCIUM 40 MG: 40 TABLET, FILM COATED ORAL at 08:36

## 2022-09-23 RX ADMIN — ENOXAPARIN SODIUM 40 MG: 100 INJECTION SUBCUTANEOUS at 08:37

## 2022-09-23 RX ADMIN — BENZONATATE 100 MG: 100 CAPSULE ORAL at 08:37

## 2022-09-23 RX ADMIN — AZITHROMYCIN DIHYDRATE 500 MG: 500 INJECTION, POWDER, LYOPHILIZED, FOR SOLUTION INTRAVENOUS at 03:03

## 2022-09-23 RX ADMIN — LISINOPRIL 20 MG: 20 TABLET ORAL at 08:37

## 2022-09-23 RX ADMIN — IPRATROPIUM BROMIDE AND ALBUTEROL SULFATE 1 AMPULE: .5; 3 SOLUTION RESPIRATORY (INHALATION) at 08:13

## 2022-09-23 RX ADMIN — INSULIN LISPRO 1 UNITS: 100 INJECTION, SOLUTION INTRAVENOUS; SUBCUTANEOUS at 12:08

## 2022-09-23 NOTE — PLAN OF CARE
Problem: Respiratory - Adult  Goal: Clear lung sounds  9/23/2022 0820 by Deejay Cid RCP  Outcome: Progressing  9/23/2022 0820 by Deejay Cid RCP  Outcome: Progressing   Patient mutually agrees on goals.

## 2022-09-23 NOTE — PROGRESS NOTES
Discharge instructions given to patient and family, verbalizes understanding. Denies any concerns, transferred to discharge lobby and assisted to private vehicle.

## 2022-09-23 NOTE — PROGRESS NOTES
[facility]  Occupational Therapy  Daily Note  Time:   Time In: 9937  Time Out: 0818  Timed Code Treatment Minutes: 23 Minutes  Minutes: 23          Date: 2022  Patient Name: Quang Bowles,   Gender: female      Room: [facility]28/028-A  MRN: [id number]  : 1960  (58 y.o.)  Referring Practitioner: Jim Monique DO  Diagnosis: acute on chronic respiratory failure with hypoxia and hypercapnia  Additional Pertinent Hx: per chart review; Quang Bowles is a 58 y.o. female with PMHx of chronic hypoxic respiratory failure on 2 L/min via nasal cannula, COPD, primary HTN, HLD, NIDDM 2, tobacco use, obesity who presents to [facility] with complaints of shortness of breath productive cough for 3 days. Dates she started 3 days ago not feeling well. She was having noticeably harder time breathing on top of her 2 L requirement at home. She has increased cough with sputum production and increased purulence which is not at her baseline. She states she has had subjective fevers at home and has felt chills. She denies headaches, chest pain, abdominal pain, N/V/D/C. Chart review has minimal information on patient's medical history. Discussed with pharmacy regarding patient's prescription fill history as questionable accuracy as a historian. It appears she has filled medications for some of her chronic disorders above past however is unclear if she still currently taking them. ED course: Patient noted to be afebrile. She was slightly tachycardic at 107 bpm and hypertensive at 146/96. She is chronically on 2 L/min however was requiring increased FiO2 upon arrival.  This was weaned down to 2 L in the ED however subsequently upon my evaluation patient was satting in the mid 80s and nasal cannula was turned up to 4 L with adequate response with SPO2 greater than 90%. BMP largely noncontributory. BNP WNL and troponin was negative.   Increased of alk phos at 227of unclear significance. Leukocytosis with a WBC of 16.7. Hgb was noted to be elevated at 17.9 consistent with chronic hypoxia. ABG showed 7.3 9/48/84/29 on 0.32 FiO2. CXR had no significant findings. ECG showing sinus tachycardia. COVID-negative. he was given a DuoNeb and 125 mg Solu-Medrol in the ED. Restrictions/Precautions:  Restrictions/Precautions: Fall Risk, General Precautions  Position Activity Restriction  Other position/activity restrictions: 2 L O2 baseline     SUBJECTIVE: Pt seated up in bed upon arrival, agreeable to OT, requesting to wash hair    PAIN: Denies pain    Vitals: Oxygen: remaining >90% on 3L    COGNITION: Decreased Insight and Decreased Safety Awareness    ADL:   Grooming: Minimal Assistance. For washing, combing hair  Lower Extremity Dressing: Stand By Assistance. Donning slipper socks . BALANCE:  Sitting Balance:  Supervision. Standing Balance: Stand By Assistance. BED MOBILITY:  Supine to Sit: Supervision    Scooting: Supervision      TRANSFERS:  Sit to Stand:  Stand By Assistance. From EOB, VC for hand placement  Stand to Sit: Stand By Assistance. To recliner, VC for body positioning    FUNCTIONAL MOBILITY:  Assistive Device: None  Assist Level:  Contact Guard Assistance. Distance:  short distances within room and to recliner  Slow pace steady throughout with no LOB. Pt able to manage O2 tubing    ADDITIONAL ACTIVITIES:  Respiratory came towards end of session initiating breathing treatment. Pt declining further OT.     ASSESSMENT:     Activity Tolerance:  Patient tolerance of  treatment: fair.         Discharge Recommendations: Continue to assess pending progress and Patient would benefit from continued OT at discharge  Equipment Recommendations: Equipment Needed: No  Plan: Times per Week: 5x  Times per Day: Daily  Current Treatment Recommendations: Strengthening, Balance training, Functional mobility training, Safety education & training, Endurance training, Neuromuscular re-education, Patient/Caregiver education & training, Self-Care / ADL    Patient Education  Patient Education: Plan of Care, ADL's, Reviewed Prior Education, and Importance of Increasing Activity    Goals  Short Term Goals  Time Frame for Short term goals: by discharge  Short Term Goal 1: patient will tolerate 5 min functional dyn standing with two hand release with (S) with O2 >/= 90% to increase activity tolerance for ADL routine. Short Term Goal 2: patient will participate in moderate resistive UB exer to increase UB strength for functional transfers. Short Term Goal 3: patient will complete ADL routine with MOD I with edu in energy conservation tech to increase ADL success. Short Term Goal 4: patient will functionally ambulate house hold distances with (S) with 0-1 verbal cues for O2 tubing mgmt. Following session, patient left in safe position with all fall risk precautions in place.

## 2022-09-23 NOTE — RT PROTOCOL NOTE
RT Nebulizer Bronchodilator Protocol Note    There is a bronchodilator order in the chart from a provider indicating to follow the RT Bronchodilator Protocol and there is an Initiate RT Bronchodilator Protocol order as well (see protocol at bottom of note). CXR Findings:  XR CHEST PORTABLE    Result Date: 9/21/2022  1. No acute findings. This document has been electronically signed by: Veronica Blancas MD on 09/21/2022 08:04 PM      The findings from the last RT Protocol Assessment were as follows:  Smoking: Chronic pulmonary disease  Respiratory Pattern: Dyspnea on exertion or RR 21-25 bpm  Breath Sounds: Slightly diminished and/or crackles  Cough: Strong, productive  Indication for Bronchodilator Therapy: Decreased or absent breath sounds  Bronchodilator Assessment Score: 7    Aerosolized bronchodilator medication orders have been revised according to the RT Nebulizer Bronchodilator Protocol below. Respiratory Therapist to perform RT Therapy Protocol Assessment initially then follow the protocol. Repeat RT Therapy Protocol Assessment PRN for score 0-3 or on second treatment, BID, and PRN for scores above 3. No Indications - adjust the frequency to every 6 hours PRN wheezing or bronchospasm, if no treatments needed after 48 hours then discontinue using Per Protocol order mode. If indication present, adjust the RT bronchodilator orders based on the Bronchodilator Assessment Score as indicated below. If a patient is on this medication at home then do not decrease Frequency below that used at home. 0-3 - enter or revise RT bronchodilator order(s) to equivalent RT Bronchodilator order with Frequency of every 4 hours PRN for wheezing or increased work of breathing using Per Protocol order mode.        4-6 - enter or revise RT Bronchodilator order(s) to two equivalent RT bronchodilator orders with one order with BID Frequency and one order with Frequency of every 4 hours PRN wheezing or increased work of breathing using Per Protocol order mode. 7-10 - enter or revise RT Bronchodilator order(s) to two equivalent RT bronchodilator orders with one order with TID Frequency and one order with Frequency of every 4 hours PRN wheezing or increased work of breathing using Per Protocol order mode. 11-13 - enter or revise RT Bronchodilator order(s) to one equivalent RT bronchodilator order with QID Frequency and an Albuterol order with Frequency of every 4 hours PRN wheezing or increased work of breathing using Per Protocol order mode. Greater than 13 - enter or revise RT Bronchodilator order(s) to one equivalent RT bronchodilator order with every 4 hours Frequency and an Albuterol order with Frequency of every 2 hours PRN wheezing or increased work of breathing using Per Protocol order mode. RT to enter RT Home Evaluation for COPD & MDI Assessment order using Per Protocol order mode.     Electronically signed by Shelley Brown RCP on 9/23/2022 at 8:22 AM

## 2022-09-23 NOTE — DISCHARGE SUMMARY
on 2 L/min via nasal cannula, COPD, primary HTN, HLD, NIDDM 2, tobacco use, obesity who presents to [facility] with complaints of shortness of breath productive cough for 3 days. Dates she started 3 days ago not feeling well. She was having noticeably harder time breathing on top of her 2 L requirement at home. She has increased cough with sputum production and increased purulence which is not at her baseline. She states she has had subjective fevers at home and has felt chills. She denies headaches, chest pain, abdominal pain, N/V/D/C. Chart review has minimal information on patient's medical history. Discussed with pharmacy regarding patient's prescription fill history as questionable accuracy as a historian. It appears she has filled medications for some of her chronic disorders above past however is unclear if she still currently taking them. ED course: Patient noted to be afebrile. She was slightly tachycardic at 107 bpm and hypertensive at 146/96. She is chronically on 2 L/min however was requiring increased FiO2 upon arrival.  This was weaned down to 2 L in the ED however subsequently upon my evaluation patient was satting in the mid 80s and nasal cannula was turned up to 4 L with adequate response with SPO2 greater than 90%. BMP largely noncontributory. BNP WNL and troponin was negative. Increased of alk phos at 227of unclear significance. Leukocytosis with a WBC of 16.7. Hgb was noted to be elevated at 17.9 consistent with chronic hypoxia. ABG showed 7.3 9/48/84/29 on 0.32 FiO2. CXR had no significant findings. ECG showing sinus tachycardia. COVID-negative. he was given a DuoNeb and 125 mg Solu-Medrol in the ED. She was admitted to hospital service for further evaluation and management. \"    9.22.2022 pt seen this am, feeling better. Discussed the need to STOP smoking patient states she cant and wont. Expect BG to rise.     9/23/2022  Patient oxygen requirements back WBC 12.6 09/23/2022 05:44 AM    HGB 14.9 09/23/2022 05:44 AM    HCT 46.8 09/23/2022 05:44 AM     09/23/2022 05:44 AM       Renal:    Lab Results   Component Value Date/Time     09/23/2022 05:44 AM    K 4.9 09/23/2022 05:44 AM    K 4.5 09/21/2022 08:01 PM    CL 98 09/23/2022 05:44 AM    CO2 29 09/23/2022 05:44 AM    BUN 15 09/23/2022 05:44 AM    CREATININE 0.6 09/23/2022 05:44 AM    CALCIUM 8.7 09/23/2022 05:44 AM       Cardiac:   Recent Labs     09/21/22 2001   TROPONINT < 0.010       Significant Diagnostic Studies    Radiology:   XR CHEST PORTABLE   Final Result   1. No acute findings. This document has been electronically signed by: Mirtha Fortune MD on 09/21/2022 08:04 PM             Consults:     None    Disposition:    [x] Home       [] TCU       [] Rehab       [] Psych       [] SNF       [] [place]       [] Other-    Condition at Discharge: Stable    Code Status:  Full Code     Pending tests at discharge:          Patient Instructions:    Discharge lab work: Activity: activity as tolerated  Diet: ADULT DIET; Regular; 5 carb choices (75 gm/meal)      Follow-up visits:   JOSE C Zambrano CNP  [address] [place] [address], [address] [place] [id number] [phone]    Schedule an appointment as soon as possible for a visit in 1 week(s)         Discharge Medications:        Medication List        START taking these medications      azithromycin 500 MG tablet  Commonly known as: ZITHROMAX  Take 1 tablet by mouth daily for 3 days     ipratropium-albuterol 0.5-2.5 (3) MG/3ML Soln nebulizer solution  Commonly known as: DUONEB  Inhale 3 mLs into the lungs every 6 hours as needed for Shortness of Breath     methylPREDNISolone 4 MG tablet  Commonly known as: MEDROL DOSEPACK  Take by mouth.      Trulicity 1.5 JV/7.8AS Sopn  Generic drug: Dulaglutide  Inject 1.5 mg into the skin once a week  Replaces: Trulicity 8.86 NN/4.7SX Sopn            CONTINUE taking these medications albuterol sulfate  (90 Base) MCG/ACT inhaler  Commonly known as: PROVENTIL;VENTOLIN;PROAIR     atorvastatin 40 MG tablet  Commonly known as: LIPITOR     lisinopril 20 MG tablet  Commonly known as: PRINIVIL;ZESTRIL     metFORMIN 1000 MG tablet  Commonly known as: GLUCOPHAGE            STOP taking these medications      Trulicity 7.87 HJ/2.8HR Sopn  Generic drug: Dulaglutide  Replaced by: Trulicity 1.5 IF/2.2AS Sopn               Where to Get Your Medications        These medications were sent to [address] [place] [address] [phone] [facility] [phone] [address] [facility], [address] [place] [address] Road [address]      Phone: [phone]   azithromycin 500 MG tablet  ipratropium-albuterol 0.5-2.5 (3) MG/3ML Soln nebulizer solution  methylPREDNISolone 4 MG tablet  Trulicity 1.5 DE/6.1PF Sopn         Time Spent on discharge is more than 45 minutes in the examination, evaluation, counseling and review of medications and discharge plan. Signed: Thank you JOSE C Cervantes CNP for the opportunity to be involved in this patient's care.     Electronically signed by Jami Alvarado PA-C on 9/23/2022 at 1:47 PM

## 2022-09-23 NOTE — CARE COORDINATION
9/23/22, 7:59 AM EDT    DISCHARGE ON [facility] day: 1  Location: [facility]28/028 Reason for admit: [facility] [J44.1]  Acute on chronic respiratory failure with hypoxia and hypercapnia ([facility] [P68.22, J96.22]   Procedure: 9/21: CXR:No acute findings  Barriers to Discharge: IV Zithromac, IV Solumedrol daily, Duoneb, PT/OT, 3L-wean as tolerated  PCP: JOSE C Ramirez  Readmission Risk Score: 7.5%  Patient Goals/Plan/Treatment Preferences: Plan to return home with S.O. Has home O2 at 2L. Will continue to follow for home going needs    POSSIBLE WEEKEND DISCHARGE  9/23/22, 8:03 AM EDT    Patient goals/plan/ treatment preferences discussed by  and . Patient goals/plan/ treatment preferences reviewed with patient/ family. Patient/ family verbalize understanding of discharge plan and are in agreement with goal/plan/treatment preferences. Understanding was demonstrated using the teach back method. AVS provided by RN at time of discharge, which includes all necessary medical information pertaining to the patients current course of illness, treatment, post-discharge goals of care, and treatment preferences.      Services At/After Discharge: None

## 2022-09-23 NOTE — PROGRESS NOTES
A home oxygen evaluation has been completed. [x]Patient is an inpatient. It is expected that the patient will be discharged within the next 48 hours. Qualified provider to write orders for possible sleep study or home oxygen prescription. Social service/care managers will arrange for home oxygen if ordered. If patient is active, arrange for Home Medical supplier to assess for Oxygen Conserving Device per pulse oximetry. []Patient is an outpatient. Results will be faxed to the ordering provider. Qualified provider to write orders for possible sleep study or home oxygen prescription. Patient was placed on room air for 20 minutes. SpO2 was 97 % on room air at rest. Patients SpO2 was 89% or above and did not qualify for home oxygen. Patient was walked for 10 minutes. SpO2 was 95 % during walking. Patients SpO2 was 89% or above and did not qualify for home oxygen. Patient does not have a positive pressure airway device at home. Patient is not  diagnosed with Obstructive Sleep Apnea. Patient will not be set up/instructed on a nocturnal study. Results will be given to qualified provider. Note: For any SpO2 at 34% see policy and procedure for possible qualifications.

## 2022-09-23 NOTE — PLAN OF CARE
Problem: Discharge Planning  Goal: Discharge to home or other facility with appropriate resources  Outcome: Progressing  Flowsheets (Taken 9/22/2022 2045)  Discharge to home or other facility with appropriate resources:   Identify barriers to discharge with patient and caregiver   Arrange for needed discharge resources and transportation as appropriate   Identify discharge learning needs (meds, wound care, etc)   Refer to discharge planning if patient needs post-hospital services based on physician order or complex needs related to functional status, cognitive ability or social support system     Problem: Pain  Goal: Verbalizes/displays adequate comfort level or baseline comfort level  Outcome: Progressing   Patient able to verbalize understanding of PRN pain medication available. Problem: Safety - Adult  Goal: Free from fall injury  Outcome: Progressing   Patient up independently in room. Call light within reach.     Problem: Cardiovascular - Adult  Goal: Maintains optimal cardiac output and hemodynamic stability  Outcome: Progressing  Flowsheets (Taken 9/22/2022 2045)  Maintains optimal cardiac output and hemodynamic stability:   Monitor blood pressure and heart rate   Monitor urine output and notify Licensed Independent Practitioner for values outside of normal range     Problem: Skin/Tissue Integrity - Adult  Goal: Skin integrity remains intact  Outcome: Progressing  Flowsheets (Taken 9/22/2022 2045)  Skin Integrity Remains Intact: Monitor for areas of redness and/or skin breakdown     Problem: Musculoskeletal - Adult  Goal: Return ADL status to a safe level of function  Outcome: Progressing  Flowsheets (Taken 9/22/2022 2045)  Return ADL Status to a Safe Level of Function:   Administer medication as ordered   Assess activities of daily living deficits and provide assistive devices as needed     Problem: Gastrointestinal - Adult  Goal: Maintains adequate nutritional intake  Outcome: Progressing  Flowsheets

## 2022-09-23 NOTE — PLAN OF CARE
Revenew was evaluated today and a DME order was entered for a nebulizer compressor in order to administer  duoneb  due to the diagnosis of COPD. The need for this equipment and treatment was discussed with the patient and she understands and is in agreement.

## 2022-09-23 NOTE — PLAN OF CARE
Problem: Respiratory - Adult  Goal: Clear lung sounds  9/23/2022 0820 by Refugio Stevenson RCP  Outcome: Progressing  9/23/2022 0820 by Refugio Stevenson RCP  Outcome: Progressing     Problem: Respiratory - Adult  Goal: Clear lung sounds  9/23/2022 0820 by Refugio Stevenson RCP  Outcome: Progressing     Problem: Discharge Planning  Goal: Discharge to home or other facility with appropriate resources  9/23/2022 1230 by Amanda Urena LPN  Outcome: Adequate for Discharge  9/23/2022 0357 by Ashvin Sharma RN  Outcome: Progressing  [address] (Taken 9/22/2022 2045)  Discharge to home or other facility with appropriate resources:   Identify barriers to discharge with patient and caregiver   Arrange for needed discharge resources and transportation as appropriate   Identify discharge learning needs (meds, wound care, etc)   Refer to discharge planning if patient needs post-hospital services based on physician order or complex needs related to functional status, cognitive ability or social support system     Problem: Pain  Goal: Verbalizes/displays adequate comfort level or baseline comfort level  9/23/2022 1230 by Amanda Urena LPN  Outcome: Adequate for Discharge  9/23/2022 0357 by Ashvin Sharma RN  Outcome: Progressing     Problem: Safety - Adult  Goal: Free from fall injury  9/23/2022 1230 by Amanda Urena LPN  Outcome: Adequate for Discharge  9/23/2022 0357 by Ashvin Sharma RN  Outcome: Progressing     Problem: Cardiovascular - Adult  Goal: Maintains optimal cardiac output and hemodynamic stability  9/23/2022 1230 by Amanda Urena LPN  Outcome: Adequate for Discharge  9/23/2022 0357 by Ashvin Sharma RN  Outcome: Progressing  Flowsheets (Taken 9/22/2022 2045)  Maintains optimal cardiac output and hemodynamic stability:   Monitor blood pressure and heart rate   Monitor urine output and notify Licensed Independent Practitioner for values outside of normal range     Problem: Skin/Tissue Integrity - within normal limits  9/23/2022 1230 by Patricia Singleton LPN  Outcome: Adequate for Discharge  9/23/2022 0357 by Roman Garcia RN  Outcome: Progressing  Flowsheets (Taken 9/22/2022 2045)  Electrolytes maintained within normal limits: Monitor labs and assess patient for signs and symptoms of electrolyte imbalances     Problem: Discharge Planning  Goal: Discharge to home or other facility with appropriate resources  9/23/2022 1230 by Patricia Singleton LPN  Outcome: Adequate for Discharge  9/23/2022 0357 by Roman Garcia RN  Outcome: Progressing  [address] (Taken 9/22/2022 2045)  Discharge to home or other facility with appropriate resources:   Identify barriers to discharge with patient and caregiver   Arrange for needed discharge resources and transportation as appropriate   Identify discharge learning needs (meds, wound care, etc)   Refer to discharge planning if patient needs post-hospital services based on physician order or complex needs related to functional status, cognitive ability or social support system     Problem: Pain  Goal: Verbalizes/displays adequate comfort level or baseline comfort level  9/23/2022 1230 by Patricia Singleton LPN  Outcome: Adequate for Discharge  9/23/2022 0357 by Roman Garcia RN  Outcome: Progressing     Problem: Safety - Adult  Goal: Free from fall injury  9/23/2022 1230 by Patricia Singleton LPN  Outcome: Adequate for Discharge  9/23/2022 0357 by Roman Garcia RN  Outcome: Progressing     Problem: Cardiovascular - Adult  Goal: Maintains optimal cardiac output and hemodynamic stability  9/23/2022 1230 by Patricia Singleton LPN  Outcome: Adequate for Discharge  9/23/2022 0357 by Roman Garcia RN  Outcome: Progressing  Flowsheets (Taken 9/22/2022 2045)  Maintains optimal cardiac output and hemodynamic stability:   Monitor blood pressure and heart rate   Monitor urine output and notify Licensed Independent Practitioner for values outside of normal range     Problem: Skin/Tissue Integrity - within normal limits  9/23/2022 1230 by Daryl Castro LPN  Outcome: Adequate for Discharge  9/23/2022 0357 by Stefany Bah RN  Outcome: Progressing  Flowsheets (Taken 9/22/2022 2045)  Electrolytes maintained within normal limits: Monitor labs and assess patient for signs and symptoms of electrolyte imbalances

## 2022-09-26 RX ORDER — IPRATROPIUM BROMIDE AND ALBUTEROL SULFATE 2.5; .5 MG/3ML; MG/3ML
3 SOLUTION RESPIRATORY (INHALATION) EVERY 6 HOURS PRN
Qty: 360 ML | Refills: 0 | Status: SHIPPED | OUTPATIENT
Start: 2022-09-26

## 2022-09-26 RX ORDER — DULAGLUTIDE 1.5 MG/.5ML
1.5 INJECTION, SOLUTION SUBCUTANEOUS WEEKLY
Qty: 4 ADJUSTABLE DOSE PRE-FILLED PEN SYRINGE | Refills: 1 | Status: SHIPPED | OUTPATIENT
Start: 2022-09-26

## 2022-09-26 RX ORDER — METHYLPREDNISOLONE 4 MG/1
TABLET ORAL
Qty: 1 KIT | Refills: 0 | Status: SHIPPED | OUTPATIENT
Start: 2022-09-26 | End: 2022-10-02

## 2022-09-26 RX ORDER — AZITHROMYCIN 500 MG/1
500 TABLET, FILM COATED ORAL DAILY
Qty: 3 TABLET | Refills: 0 | Status: SHIPPED | OUTPATIENT
Start: 2022-09-26 | End: 2022-09-29

## 2023-01-12 ENCOUNTER — HOSPITAL ENCOUNTER (OUTPATIENT)
Age: 63
Setting detail: SPECIMEN
Discharge: HOME | End: 2023-01-12

## 2023-01-12 LAB
ALBUMIN SERPL-MCNC: 3.7 G/DL (ref 3.5–5.2)
ALBUMIN/GLOBULIN RATIO: 1.3 (ref 1–2.5)
ALP BLD-CCNC: 216 U/L (ref 35–104)
ALT SERPL-CCNC: 36 U/L (ref 5–33)
ANION GAP SERPL CALCULATED.3IONS-SCNC: 14 MMOL/L (ref 9–17)
AST SERPL-CCNC: 37 U/L
BILIRUB SERPL-MCNC: 0.2 MG/DL (ref 0.3–1.2)
BUN BLDV-MCNC: 11 MG/DL (ref 8–23)
CALCIUM SERPL-MCNC: 9.9 MG/DL (ref 8.6–10.4)
CHLORIDE BLD-SCNC: 91 MMOL/L (ref 98–107)
CHOLESTEROL/HDL RATIO: 6.2
CHOLESTEROL: 261 MG/DL
CO2: 30 MMOL/L (ref 20–31)
CREAT SERPL-MCNC: 0.55 MG/DL (ref 0.5–0.9)
GFR SERPL CREATININE-BSD FRML MDRD: >60 ML/MIN/1.73M2
GLUCOSE BLD-MCNC: 364 MG/DL (ref 70–99)
HDLC SERPL-MCNC: 42 MG/DL
LDL CHOLESTEROL: ABNORMAL MG/DL (ref 0–130)
POTASSIUM SERPL-SCNC: 4.7 MMOL/L (ref 3.7–5.3)
SODIUM BLD-SCNC: 135 MMOL/L (ref 135–144)
TOTAL PROTEIN: 6.6 G/DL (ref 6.4–8.3)
TRIGL SERPL-MCNC: 472 MG/DL
TSH SERPL DL<=0.05 MIU/L-ACNC: 2.11 UIU/ML (ref 0.3–5)

## 2023-01-13 LAB — LDL CHOLESTEROL DIRECT: 163 MG/DL

## 2023-09-15 ENCOUNTER — HOSPITAL ENCOUNTER (EMERGENCY)
Age: 63
Discharge: LEFT BEFORE DISCHARGE | End: 2023-09-15
Attending: EMERGENCY MEDICINE
Payer: COMMERCIAL

## 2023-09-15 VITALS
HEIGHT: 59 IN | WEIGHT: 174 LBS | OXYGEN SATURATION: 95 % | BODY MASS INDEX: 35.08 KG/M2 | TEMPERATURE: 98.2 F | HEART RATE: 95 BPM | SYSTOLIC BLOOD PRESSURE: 164 MMHG | RESPIRATION RATE: 18 BRPM | DIASTOLIC BLOOD PRESSURE: 86 MMHG

## 2023-09-15 DIAGNOSIS — Z53.21 PATIENT LEFT WITHOUT BEING SEEN: Primary | ICD-10-CM

## 2023-09-15 LAB
GLUCOSE BLD STRIP.AUTO-MCNC: 208 MG/DL (ref 70–108)
GLUCOSE BLD-MCNC: 208 MG/DL

## 2023-09-15 PROCEDURE — 82948 REAGENT STRIP/BLOOD GLUCOSE: CPT

## 2023-09-15 PROCEDURE — 4500000002 HC ER NO CHARGE

## 2023-09-15 PROCEDURE — 93005 ELECTROCARDIOGRAM TRACING: CPT | Performed by: EMERGENCY MEDICINE

## 2023-09-15 ASSESSMENT — PAIN - FUNCTIONAL ASSESSMENT: PAIN_FUNCTIONAL_ASSESSMENT: 0-10

## 2023-09-15 ASSESSMENT — PAIN SCALES - GENERAL: PAINLEVEL_OUTOF10: 8

## 2023-09-16 LAB
EKG ATRIAL RATE: 86 BPM
EKG P AXIS: 71 DEGREES
EKG P-R INTERVAL: 158 MS
EKG Q-T INTERVAL: 376 MS
EKG QRS DURATION: 68 MS
EKG QTC CALCULATION (BAZETT): 449 MS
EKG R AXIS: 7 DEGREES
EKG T AXIS: 97 DEGREES
EKG VENTRICULAR RATE: 86 BPM

## 2023-09-16 NOTE — ED NOTES
Pt called out to this nurse and stated \"I just want to leave. I'll see my doctor tomorrow\". This nurse spoke with Dr. Alvarez Griffin and Dr. Anisa Pugh and went to pt's room. Pt's room is empty and c-collar is on the bed. Providers notified.      Erasmo Wooten RN  09/15/23 2038
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
## 2023-12-20 NOTE — ED NOTES
Patient resting in bed at this time. Patient on 3L nasal cannula.  Call light within reach     Rivera Ortega RN  09/21/22 [phone]